# Patient Record
Sex: FEMALE | Race: WHITE
[De-identification: names, ages, dates, MRNs, and addresses within clinical notes are randomized per-mention and may not be internally consistent; named-entity substitution may affect disease eponyms.]

---

## 2020-03-24 ENCOUNTER — HOSPITAL ENCOUNTER (OUTPATIENT)
Age: 85
End: 2020-03-24
Payer: MEDICARE

## 2020-03-24 DIAGNOSIS — K86.89: Primary | ICD-10-CM

## 2020-03-24 PROCEDURE — 74183 MRI ABD W/O CNTR FLWD CNTR: CPT

## 2020-03-24 PROCEDURE — A9575 INJ GADOTERATE MEGLUMI 0.1ML: HCPCS

## 2023-05-02 LAB
ALANINE AMINOTRANSFERASE (SGPT) (U/L) IN SER/PLAS: 11 U/L (ref 7–45)
ALBUMIN (G/DL) IN SER/PLAS: 4.3 G/DL (ref 3.4–5)
ALKALINE PHOSPHATASE (U/L) IN SER/PLAS: 70 U/L (ref 33–136)
ANION GAP IN SER/PLAS: 12 MMOL/L (ref 10–20)
ASPARTATE AMINOTRANSFERASE (SGOT) (U/L) IN SER/PLAS: 19 U/L (ref 9–39)
BILIRUBIN TOTAL (MG/DL) IN SER/PLAS: 0.8 MG/DL (ref 0–1.2)
CALCIUM (MG/DL) IN SER/PLAS: 9.4 MG/DL (ref 8.6–10.3)
CARBON DIOXIDE, TOTAL (MMOL/L) IN SER/PLAS: 29 MMOL/L (ref 21–32)
CHLORIDE (MMOL/L) IN SER/PLAS: 103 MMOL/L (ref 98–107)
CHOLESTEROL (MG/DL) IN SER/PLAS: 223 MG/DL (ref 0–199)
CHOLESTEROL IN HDL (MG/DL) IN SER/PLAS: 81 MG/DL
CHOLESTEROL/HDL RATIO: 2.8
CREATININE (MG/DL) IN SER/PLAS: 1.04 MG/DL (ref 0.5–1.05)
GFR FEMALE: 52 ML/MIN/1.73M2
GLUCOSE (MG/DL) IN SER/PLAS: 85 MG/DL (ref 74–99)
LDL: 121 MG/DL (ref 0–99)
POTASSIUM (MMOL/L) IN SER/PLAS: 3.9 MMOL/L (ref 3.5–5.3)
PROTEIN TOTAL: 7.6 G/DL (ref 6.4–8.2)
SODIUM (MMOL/L) IN SER/PLAS: 140 MMOL/L (ref 136–145)
THYROTROPIN (MIU/L) IN SER/PLAS BY DETECTION LIMIT <= 0.05 MIU/L: 3.56 MIU/L (ref 0.44–3.98)
TRIGLYCERIDE (MG/DL) IN SER/PLAS: 103 MG/DL (ref 0–149)
UREA NITROGEN (MG/DL) IN SER/PLAS: 14 MG/DL (ref 6–23)
VLDL: 21 MG/DL (ref 0–40)

## 2023-05-16 ENCOUNTER — OFFICE VISIT (OUTPATIENT)
Dept: PRIMARY CARE | Facility: CLINIC | Age: 88
End: 2023-05-16
Payer: COMMERCIAL

## 2023-05-16 VITALS
BODY MASS INDEX: 27.79 KG/M2 | WEIGHT: 151 LBS | DIASTOLIC BLOOD PRESSURE: 74 MMHG | HEIGHT: 62 IN | OXYGEN SATURATION: 97 % | SYSTOLIC BLOOD PRESSURE: 110 MMHG | HEART RATE: 72 BPM

## 2023-05-16 DIAGNOSIS — M81.0 MENOPAUSAL OSTEOPOROSIS: ICD-10-CM

## 2023-05-16 DIAGNOSIS — K45.8 LUMBAR HERNIA: ICD-10-CM

## 2023-05-16 DIAGNOSIS — E53.8 VITAMIN B12 DEFICIENCY: ICD-10-CM

## 2023-05-16 DIAGNOSIS — E78.49 FAMILIAL HYPERLIPIDEMIA: ICD-10-CM

## 2023-05-16 DIAGNOSIS — R47.02: ICD-10-CM

## 2023-05-16 DIAGNOSIS — E03.9 ACQUIRED HYPOTHYROIDISM: Primary | ICD-10-CM

## 2023-05-16 DIAGNOSIS — I10 ESSENTIAL HYPERTENSION: ICD-10-CM

## 2023-05-16 DIAGNOSIS — N18.31 STAGE 3A CHRONIC KIDNEY DISEASE (MULTI): ICD-10-CM

## 2023-05-16 DIAGNOSIS — G40.909 SEIZURE DISORDER (MULTI): ICD-10-CM

## 2023-05-16 DIAGNOSIS — K59.04 CHRONIC IDIOPATHIC CONSTIPATION: ICD-10-CM

## 2023-05-16 DIAGNOSIS — R13.19 ESOPHAGEAL DYSPHAGIA: ICD-10-CM

## 2023-05-16 PROBLEM — S82.032A CLOSED DISPLACED TRANSVERSE FRACTURE OF LEFT PATELLA: Status: ACTIVE | Noted: 2018-03-08

## 2023-05-16 PROBLEM — S82.032A CLOSED DISPLACED TRANSVERSE FRACTURE OF LEFT PATELLA: Status: RESOLVED | Noted: 2018-03-08 | Resolved: 2023-05-16

## 2023-05-16 PROBLEM — E87.6 HYPOKALEMIA: Status: ACTIVE | Noted: 2023-05-16

## 2023-05-16 PROBLEM — K59.00 CONSTIPATION: Status: ACTIVE | Noted: 2023-05-16

## 2023-05-16 PROBLEM — E87.6 HYPOKALEMIA: Status: RESOLVED | Noted: 2023-05-16 | Resolved: 2023-05-16

## 2023-05-16 PROBLEM — L21.9 SEBORRHEIC DERMATITIS: Status: ACTIVE | Noted: 2023-05-16

## 2023-05-16 PROCEDURE — 1159F MED LIST DOCD IN RCRD: CPT | Performed by: FAMILY MEDICINE

## 2023-05-16 PROCEDURE — 3078F DIAST BP <80 MM HG: CPT | Performed by: FAMILY MEDICINE

## 2023-05-16 PROCEDURE — 3074F SYST BP LT 130 MM HG: CPT | Performed by: FAMILY MEDICINE

## 2023-05-16 PROCEDURE — 99214 OFFICE O/P EST MOD 30 MIN: CPT | Performed by: FAMILY MEDICINE

## 2023-05-16 PROCEDURE — 1160F RVW MEDS BY RX/DR IN RCRD: CPT | Performed by: FAMILY MEDICINE

## 2023-05-16 PROCEDURE — 1036F TOBACCO NON-USER: CPT | Performed by: FAMILY MEDICINE

## 2023-05-16 RX ORDER — LAMOTRIGINE 25 MG/1
3 TABLET ORAL 2 TIMES DAILY
COMMUNITY
End: 2024-01-25 | Stop reason: SDUPTHER

## 2023-05-16 RX ORDER — NAPROXEN SODIUM 220 MG/1
81 TABLET, FILM COATED ORAL DAILY
COMMUNITY
Start: 2018-01-28

## 2023-05-16 RX ORDER — LEVOTHYROXINE SODIUM 88 UG/1
88 TABLET ORAL DAILY
COMMUNITY
End: 2024-01-25 | Stop reason: SDUPTHER

## 2023-05-16 RX ORDER — GABAPENTIN 100 MG/1
100 CAPSULE ORAL DAILY
COMMUNITY
Start: 2023-02-06 | End: 2023-11-16 | Stop reason: ALTCHOICE

## 2023-05-16 RX ORDER — MULTIVITAMIN
1 TABLET ORAL DAILY
COMMUNITY
Start: 2020-01-13

## 2023-05-16 RX ORDER — ASPIRIN 325 MG
1 TABLET, DELAYED RELEASE (ENTERIC COATED) ORAL
COMMUNITY

## 2023-05-16 RX ORDER — LANOLIN ALCOHOL/MO/W.PET/CERES
1 CREAM (GRAM) TOPICAL DAILY
COMMUNITY
Start: 2021-07-13

## 2023-05-16 RX ORDER — TRIAMTERENE/HYDROCHLOROTHIAZID 37.5-25 MG
1 TABLET ORAL DAILY
COMMUNITY
End: 2024-05-02 | Stop reason: SDUPTHER

## 2023-05-16 ASSESSMENT — PATIENT HEALTH QUESTIONNAIRE - PHQ9
2. FEELING DOWN, DEPRESSED OR HOPELESS: NOT AT ALL
1. LITTLE INTEREST OR PLEASURE IN DOING THINGS: NOT AT ALL
SUM OF ALL RESPONSES TO PHQ9 QUESTIONS 1 AND 2: 0

## 2023-05-16 NOTE — PROGRESS NOTES
"Subjective   Patient ID: Nimisha Ingram is a 88 y.o. female who presents for Med Management.    HPI   Acquired hypothyroidism NOS - Med works well without side effects. Good in May 2023.  No temp intolerance.      Cervical pain (neck) - Comes and goes. Advil or Tylenol helps.      Familial hyperlipidemia  and HTN - no meds and great HDL in May 2023. No Chest pain, Dyspnea, palpitations, numbness, weakness, edema, claudications, or double vision/ loss of vision.  Some edema when she rides a lot.  The dizziness and pain in head is better not she was having is better with the gabapentin.   Can cancel neurology appointment.  Says in 140s at times.      Family history colon cancer - scope in 2016 . No blood      History of small bowel obstruction no blockage. No blood in the stool. No diarrhea or constipation or bloating now      Lumbar hernia treated with surgery and doing well but having some bulging now. Will call if worse and I can refer back to surgery. Cannot lift much. Pain at times if she lays on that side.  Seems better the last few weeks. Shoulder at times in traps. Cervical spondylosis.      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 She is wondering why she has that. Has been to Dr Daily.      Hearing loss - She wears hearing aids at times. Not very often as by herself.      Presbyesophagus - has some times when she has to drink water to get food down.      Seizure disorder - no recurrence for years      B12 deficiency - noted last time. She is on B12 and good last time       Stage 3A CKD - GFR stable at 52    Review of Systems    Objective   /74 (BP Location: Left arm, Patient Position: Sitting)   Pulse 72   Ht 1.575 m (5' 2\")   Wt 68.5 kg (151 lb)   SpO2 97%   BMI 27.62 kg/m²     Physical Exam  Vitals reviewed.   Constitutional:       General: " She is not in acute distress.     Appearance: Normal appearance.   HENT:      Head: Normocephalic.      Right Ear: Tympanic membrane normal.      Left Ear: Tympanic membrane normal.      Nose: Nose normal.      Mouth/Throat:      Pharynx: Oropharynx is clear.      Comments: Dentures   Eyes:      Extraocular Movements: Extraocular movements intact.      Conjunctiva/sclera: Conjunctivae normal.      Pupils: Pupils are equal, round, and reactive to light.   Neck:      Vascular: No carotid bruit.   Cardiovascular:      Rate and Rhythm: Normal rate and regular rhythm.      Pulses: Normal pulses.      Heart sounds: Normal heart sounds. No murmur heard.  Pulmonary:      Effort: Pulmonary effort is normal. No respiratory distress.      Breath sounds: Normal breath sounds.   Abdominal:      General: Abdomen is flat. Bowel sounds are normal. There is no distension.      Palpations: Abdomen is soft. There is no mass.      Tenderness: There is no abdominal tenderness.   Musculoskeletal:      Cervical back: Normal range of motion and neck supple. No tenderness.      Comments: Tight and tender in the lower paracervical muscles and traps    Lymphadenopathy:      Cervical: No cervical adenopathy.   Skin:     General: Skin is warm and dry.      Findings: No rash.   Neurological:      General: No focal deficit present.      Mental Status: She is alert and oriented to person, place, and time.   Psychiatric:         Mood and Affect: Mood normal.         Thought Content: Thought content normal.         Judgment: Judgment normal.         Assessment/Plan   Problem List Items Addressed This Visit       Acquired hypothyroidism - Primary    Constipation    Esophageal dysphagia    Essential hypertension    Familial hyperlipidemia    Lumbar hernia    Menopausal osteoporosis    Motor dysphasia    Seizure disorder (CMS/HCC)    Stage 3a chronic kidney disease    Relevant Orders    Comprehensive Metabolic Panel    CBC    Follow Up In Primary Care     Vitamin B12 deficiency    Relevant Orders    Vitamin B12

## 2023-11-07 ENCOUNTER — LAB (OUTPATIENT)
Dept: LAB | Facility: LAB | Age: 88
End: 2023-11-07
Payer: COMMERCIAL

## 2023-11-07 DIAGNOSIS — N18.31 STAGE 3A CHRONIC KIDNEY DISEASE (MULTI): ICD-10-CM

## 2023-11-07 DIAGNOSIS — E53.8 VITAMIN B12 DEFICIENCY: ICD-10-CM

## 2023-11-07 LAB
ALBUMIN SERPL BCP-MCNC: 4.6 G/DL (ref 3.4–5)
ALP SERPL-CCNC: 64 U/L (ref 33–136)
ALT SERPL W P-5'-P-CCNC: 12 U/L (ref 7–45)
ANION GAP SERPL CALC-SCNC: 12 MMOL/L (ref 10–20)
AST SERPL W P-5'-P-CCNC: 19 U/L (ref 9–39)
BILIRUB SERPL-MCNC: 0.8 MG/DL (ref 0–1.2)
BUN SERPL-MCNC: 16 MG/DL (ref 6–23)
CALCIUM SERPL-MCNC: 9.7 MG/DL (ref 8.6–10.3)
CHLORIDE SERPL-SCNC: 101 MMOL/L (ref 98–107)
CO2 SERPL-SCNC: 30 MMOL/L (ref 21–32)
CREAT SERPL-MCNC: 1.03 MG/DL (ref 0.5–1.05)
ERYTHROCYTE [DISTWIDTH] IN BLOOD BY AUTOMATED COUNT: 12.2 % (ref 11.5–14.5)
GFR SERPL CREATININE-BSD FRML MDRD: 53 ML/MIN/1.73M*2
GLUCOSE SERPL-MCNC: 97 MG/DL (ref 74–99)
HCT VFR BLD AUTO: 47.7 % (ref 36–46)
HGB BLD-MCNC: 15.1 G/DL (ref 12–16)
MCH RBC QN AUTO: 29.7 PG (ref 26–34)
MCHC RBC AUTO-ENTMCNC: 31.7 G/DL (ref 32–36)
MCV RBC AUTO: 94 FL (ref 80–100)
NRBC BLD-RTO: 0 /100 WBCS (ref 0–0)
PLATELET # BLD AUTO: 380 X10*3/UL (ref 150–450)
POTASSIUM SERPL-SCNC: 4.1 MMOL/L (ref 3.5–5.3)
PROT SERPL-MCNC: 7.2 G/DL (ref 6.4–8.2)
RBC # BLD AUTO: 5.09 X10*6/UL (ref 4–5.2)
SODIUM SERPL-SCNC: 139 MMOL/L (ref 136–145)
WBC # BLD AUTO: 5.3 X10*3/UL (ref 4.4–11.3)

## 2023-11-07 PROCEDURE — 80053 COMPREHEN METABOLIC PANEL: CPT

## 2023-11-07 PROCEDURE — 85027 COMPLETE CBC AUTOMATED: CPT

## 2023-11-07 PROCEDURE — 36415 COLL VENOUS BLD VENIPUNCTURE: CPT

## 2023-11-07 PROCEDURE — 82607 VITAMIN B-12: CPT

## 2023-11-08 LAB — VIT B12 SERPL-MCNC: 1312 PG/ML (ref 211–911)

## 2023-11-16 ENCOUNTER — OFFICE VISIT (OUTPATIENT)
Dept: PRIMARY CARE | Facility: CLINIC | Age: 88
End: 2023-11-16
Payer: COMMERCIAL

## 2023-11-16 VITALS
BODY MASS INDEX: 25.97 KG/M2 | HEART RATE: 78 BPM | OXYGEN SATURATION: 96 % | WEIGHT: 142 LBS | DIASTOLIC BLOOD PRESSURE: 80 MMHG | SYSTOLIC BLOOD PRESSURE: 138 MMHG

## 2023-11-16 DIAGNOSIS — E03.9 ACQUIRED HYPOTHYROIDISM: ICD-10-CM

## 2023-11-16 DIAGNOSIS — R53.1 SPELL OF GENERALIZED WEAKNESS: ICD-10-CM

## 2023-11-16 DIAGNOSIS — M81.0 MENOPAUSAL OSTEOPOROSIS: ICD-10-CM

## 2023-11-16 DIAGNOSIS — L21.9 SEBORRHEIC DERMATITIS: ICD-10-CM

## 2023-11-16 DIAGNOSIS — R00.2 HEART PALPITATIONS: ICD-10-CM

## 2023-11-16 DIAGNOSIS — E78.49 FAMILIAL HYPERLIPIDEMIA: ICD-10-CM

## 2023-11-16 DIAGNOSIS — K45.8 LUMBAR HERNIA: ICD-10-CM

## 2023-11-16 DIAGNOSIS — E53.8 VITAMIN B12 DEFICIENCY: ICD-10-CM

## 2023-11-16 DIAGNOSIS — G40.909 SEIZURE DISORDER (MULTI): ICD-10-CM

## 2023-11-16 DIAGNOSIS — R47.02: ICD-10-CM

## 2023-11-16 DIAGNOSIS — Z00.00 ROUTINE GENERAL MEDICAL EXAMINATION AT HEALTH CARE FACILITY: Primary | ICD-10-CM

## 2023-11-16 DIAGNOSIS — R13.19 ESOPHAGEAL DYSPHAGIA: ICD-10-CM

## 2023-11-16 DIAGNOSIS — K59.04 CHRONIC IDIOPATHIC CONSTIPATION: ICD-10-CM

## 2023-11-16 DIAGNOSIS — I10 ESSENTIAL HYPERTENSION: ICD-10-CM

## 2023-11-16 DIAGNOSIS — N18.31 STAGE 3A CHRONIC KIDNEY DISEASE (MULTI): ICD-10-CM

## 2023-11-16 PROCEDURE — 1160F RVW MEDS BY RX/DR IN RCRD: CPT | Performed by: FAMILY MEDICINE

## 2023-11-16 PROCEDURE — G0439 PPPS, SUBSEQ VISIT: HCPCS | Performed by: FAMILY MEDICINE

## 2023-11-16 PROCEDURE — 3075F SYST BP GE 130 - 139MM HG: CPT | Performed by: FAMILY MEDICINE

## 2023-11-16 PROCEDURE — 3079F DIAST BP 80-89 MM HG: CPT | Performed by: FAMILY MEDICINE

## 2023-11-16 PROCEDURE — 1170F FXNL STATUS ASSESSED: CPT | Performed by: FAMILY MEDICINE

## 2023-11-16 PROCEDURE — 99214 OFFICE O/P EST MOD 30 MIN: CPT | Performed by: FAMILY MEDICINE

## 2023-11-16 PROCEDURE — 1036F TOBACCO NON-USER: CPT | Performed by: FAMILY MEDICINE

## 2023-11-16 PROCEDURE — 1159F MED LIST DOCD IN RCRD: CPT | Performed by: FAMILY MEDICINE

## 2023-11-16 ASSESSMENT — ACTIVITIES OF DAILY LIVING (ADL)
DOING_HOUSEWORK: INDEPENDENT
MANAGING_FINANCES: INDEPENDENT
BATHING: INDEPENDENT
TAKING_MEDICATION: INDEPENDENT
GROCERY_SHOPPING: INDEPENDENT
DRESSING: INDEPENDENT

## 2023-11-16 ASSESSMENT — PATIENT HEALTH QUESTIONNAIRE - PHQ9
2. FEELING DOWN, DEPRESSED OR HOPELESS: NOT AT ALL
SUM OF ALL RESPONSES TO PHQ9 QUESTIONS 1 AND 2: 0
1. LITTLE INTEREST OR PLEASURE IN DOING THINGS: NOT AT ALL

## 2023-11-16 NOTE — PROGRESS NOTES
Subjective   Reason for Visit: Nimisha Ingram is an 88 y.o. female here for a Medicare Wellness visit.     Past Medical, Surgical, and Family History reviewed and updated in chart.    Reviewed all medications by prescribing practitioner or clinical pharmacist (such as prescriptions, OTCs, herbal therapies and supplements) and documented in the medical record.    HPI  Acquired hypothyroidism NOS - Med works well without side effects. Good in May 2023.  No temp intolerance.      Cervical pain (neck) - Comes and goes. Advil or Tylenol helps.      Familial hyperlipidemia  and HTN - no meds and great HDL in May 2023. No Chest pain, palpitations, numbness, weakness, edema, claudications, or double vision/ loss of vision.  Some edema when she rides a lot.  Short of breath if she does much. Does have weakn spells and lightheaded. This is different from the pain and dizzinesss she had in the past.  When she has it will get some heart racing.      Family history colon cancer - scope in 2016.  No blood      History of small bowel obstruction no blockage. No blood in the stool. No diarrhea or constipation or bloating now      Lumbar hernia treated with surgery and doing well but having some bulging now. Left flank.   Will call if worse and I can refer back to surgery. Cannot lift much. Pain at times if she lays on that side.   Shoulder at times in traps but has Cervical spondylosis.      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 She is wondering why she has that. Has been to Dr Daily.      Hearing loss - She wears hearing aids at times. Not very often as by herself.      Presbyesophagus - has some times when she has to drink water to get food down.      Seizure disorder - no recurrence for years      B12 deficiency - noted last time. She is on B12 and good last time        Stage 3A CKD - GFR stable at 53    LW and DPA no but will be sons and daughter  Pneumnovax UTD    Patient Care Team:  Evaristo Borja MD as PCP - General  Evaristo Borja MD as PCP - [facility] [employer] PCP     Review of Systems    Objective   Vitals:  /80 (BP Location: Right arm, Patient Position: Sitting)   Pulse 78   Wt 64.4 kg (142 lb)   SpO2 96%   BMI 25.97 kg/m²       Physical Exam  Vitals reviewed.   Constitutional:       General: She is not in acute distress.     Appearance: Normal appearance.   HENT:      Head: Normocephalic.      Right Ear: Tympanic membrane, ear canal and external ear normal.      Left Ear: Tympanic membrane, ear canal and external ear normal. Decreased hearing noted.      Nose: Nose normal.      Mouth/Throat:      Pharynx: Oropharynx is clear.      Comments: Dentures in place upper and lower   Eyes:      Extraocular Movements: Extraocular movements intact.      Conjunctiva/sclera: Conjunctivae normal.      Pupils: Pupils are equal, round, and reactive to light.   Neck:      Vascular: No carotid bruit.   Cardiovascular:      Rate and Rhythm: Normal rate and regular rhythm.      Pulses: Normal pulses.      Heart sounds: Normal heart sounds. No murmur heard.  Pulmonary:      Effort: Pulmonary effort is normal. No respiratory distress.      Breath sounds: Normal breath sounds.   Abdominal:      General: Abdomen is flat. Bowel sounds are normal. There is no distension.      Palpations: Abdomen is soft. There is no mass.      Tenderness: There is no abdominal tenderness.   Musculoskeletal:      Cervical back: Normal range of motion and neck supple. No tenderness.   Lymphadenopathy:      Cervical: No cervical adenopathy.   Skin:     General: Skin is warm and dry.      Findings: No rash.   Neurological:      General: No focal deficit present.      Mental Status: She is alert and oriented to person, place, and time.   Psychiatric:         Mood and Affect: Mood normal.          Thought Content: Thought content normal.         Judgment: Judgment normal.         Assessment/Plan   Problem List Items Addressed This Visit       Acquired hypothyroidism    Relevant Orders    TSH with reflex to Free T4 if abnormal    Constipation    Esophageal dysphagia    Essential hypertension    Familial hyperlipidemia    Relevant Orders    Lipid Panel    Lumbar hernia    Menopausal osteoporosis    Motor dysphasia    Seborrheic dermatitis    Seizure disorder (CMS/HCC)    Stage 3a chronic kidney disease (CMS/HCC)    Relevant Orders    Comprehensive Metabolic Panel    CBC    Follow Up In Primary Care - Established    Vitamin B12 deficiency     Other Visit Diagnoses       Routine general medical examination at health care facility    -  Primary    Spell of generalized weakness        Relevant Orders    Holter or Event Cardiac Monitor    Heart palpitations        Relevant Orders    Holter or Event Cardiac Monitor

## 2023-11-20 ENCOUNTER — HOSPITAL ENCOUNTER (OUTPATIENT)
Dept: CARDIOLOGY | Facility: HOSPITAL | Age: 88
Discharge: HOME | End: 2023-11-20
Payer: COMMERCIAL

## 2023-11-20 DIAGNOSIS — R00.2 HEART PALPITATIONS: ICD-10-CM

## 2023-11-20 DIAGNOSIS — R53.1 SPELL OF GENERALIZED WEAKNESS: ICD-10-CM

## 2023-11-20 PROCEDURE — 93244 EXT ECG>48HR<7D REV&INTERPJ: CPT | Performed by: STUDENT IN AN ORGANIZED HEALTH CARE EDUCATION/TRAINING PROGRAM

## 2023-11-20 PROCEDURE — 93242 EXT ECG>48HR<7D RECORDING: CPT

## 2023-11-20 PROCEDURE — 9420000001 HC RT PATIENT EDUCATION 5 MIN

## 2023-12-13 ENCOUNTER — TELEPHONE (OUTPATIENT)
Dept: PRIMARY CARE | Facility: CLINIC | Age: 88
End: 2023-12-13
Payer: COMMERCIAL

## 2023-12-13 DIAGNOSIS — I47.10 SVT (SUPRAVENTRICULAR TACHYCARDIA) (CMS-HCC): Primary | ICD-10-CM

## 2023-12-13 NOTE — TELEPHONE ENCOUNTER
Patients son was calling about her holter monitor results from November.  Stated patient hasn't heard anything about the results from it.    Please call him   Infliximab Counseling:  I discussed with the patient the risks of infliximab including but not limited to myelosuppression, immunosuppression, autoimmune hepatitis, demyelinating diseases, lymphoma, and serious infections.  The patient understands that monitoring is required including a PPD at baseline and must alert us or the primary physician if symptoms of infection or other concerning signs are noted.

## 2023-12-15 ENCOUNTER — APPOINTMENT (OUTPATIENT)
Dept: CARDIOLOGY | Facility: CLINIC | Age: 88
End: 2023-12-15
Payer: COMMERCIAL

## 2023-12-20 ENCOUNTER — OFFICE VISIT (OUTPATIENT)
Dept: URGENT CARE | Facility: CLINIC | Age: 88
End: 2023-12-20
Payer: COMMERCIAL

## 2023-12-20 VITALS
HEART RATE: 85 BPM | DIASTOLIC BLOOD PRESSURE: 74 MMHG | RESPIRATION RATE: 14 BRPM | TEMPERATURE: 97.8 F | BODY MASS INDEX: 26.13 KG/M2 | WEIGHT: 142 LBS | SYSTOLIC BLOOD PRESSURE: 176 MMHG | OXYGEN SATURATION: 96 % | HEIGHT: 62 IN

## 2023-12-20 DIAGNOSIS — J20.9 ACUTE BRONCHITIS, UNSPECIFIED ORGANISM: Primary | ICD-10-CM

## 2023-12-20 PROCEDURE — 99213 OFFICE O/P EST LOW 20 MIN: CPT | Mod: 25

## 2023-12-20 RX ORDER — DEXAMETHASONE 6 MG/1
6 TABLET ORAL DAILY
Qty: 5 TABLET | Refills: 0 | Status: SHIPPED | OUTPATIENT
Start: 2023-12-20 | End: 2023-12-25

## 2023-12-20 RX ORDER — DOXYCYCLINE 100 MG/1
100 TABLET ORAL 2 TIMES DAILY
Qty: 14 TABLET | Refills: 0 | Status: SHIPPED | OUTPATIENT
Start: 2023-12-20 | End: 2023-12-27

## 2023-12-20 RX ORDER — ALBUTEROL SULFATE 90 UG/1
2 AEROSOL, METERED RESPIRATORY (INHALATION) EVERY 6 HOURS PRN
Qty: 18 G | Refills: 0 | Status: SHIPPED | OUTPATIENT
Start: 2023-12-20 | End: 2024-04-18 | Stop reason: ALTCHOICE

## 2023-12-20 NOTE — PROGRESS NOTES
88 y.o. female presents for evaluation of URI.  Symptoms including cough, congestion, body aches, malaise, and headache have been present for 10 days and refractory to OTC meds.  No fever, chills, nausea, vomiting, abdominal pain, CP, or SOB.  No exacerbating factors. No known COVID 19/flu exposure.        Vitals:    12/20/23 1029   BP: 176/74   Pulse: 85   Resp: 14   Temp: 36.6 °C (97.8 °F)   SpO2: 96%       Allergies   Allergen Reactions    Cefazolin Unknown    Fish Containing Products Unknown    Iodinated Contrast Media Unknown       Medication Documentation Review Audit       Reviewed by Yohana Aguilar MA (Medical Assistant) on 12/20/23 at 1036      Medication Order Taking? Sig Documenting Provider Last Dose Status   aspirin 81 mg chewable tablet [id number] Yes Chew 1 tablet (81 mg) once daily. Historical Provider, MD Taking Active   cholecalciferol (Vitamin D-3) 1,250 mcg (50,000 unit) capsule [id number] Yes Take 1 capsule (50,000 Units) by mouth 1 (one) time per week. Historical MD Eric Taking Active   cyanocobalamin (Vitamin B-12) 1,000 mcg tablet [id number] Yes Take 1 tablet (1,000 mcg) by mouth once daily. Historical Provider, MD Taking Active   lamoTRIgine (LaMICtal) 25 mg tablet [id number] Yes Take 3 tablets (75 mg) by mouth 2 times a day. Historical Provider, MD Taking Active   levothyroxine (Synthroid, Levoxyl) 88 mcg tablet [id number] Yes Take 1 tablet (88 mcg) by mouth once daily. Historical Provider, MD Taking Active   multivitamin tablet [id number] Yes Take 1 tablet by mouth once daily. Historical Provider, MD Taking Active   triamterene-hydrochlorothiazid (Maxzide-25) 37.5-25 mg tablet [id number] Yes Take 1 tablet by mouth once daily. Take with food. Historical Provider, MD Taking Active                    Past Medical History:   Diagnosis Date    Acute gastritis without bleeding 07/14/2020    Acute gastritis without hemorrhage, unspecified gastritis type    Disorder of thyroid, unspecified 11/19/2019     Thyroid trouble    Encounter for general adult medical examination without abnormal findings 07/14/2020    Medicare annual wellness visit, initial    Personal history of other diseases of the digestive system 07/13/2021    History of constipation    Personal history of other diseases of the musculoskeletal system and connective tissue 11/19/2019    History of arthritis    Personal history of other diseases of the nervous system and sense organs 01/11/2022    History of mastoiditis    Personal history of other diseases of urinary system     History of kidney disease    Unspecified convulsions (CMS/HCC) 11/19/2019    Seizures       Past Surgical History:   Procedure Laterality Date    OTHER SURGICAL HISTORY  11/19/2019    Back surgery    OTHER SURGICAL HISTORY  11/19/2019    Ovarian surgery    OTHER SURGICAL HISTORY  11/19/2019    Knee surgery    OTHER SURGICAL HISTORY  01/13/2020    Small bowel resection    OTHER SURGICAL HISTORY  01/13/2020    Hysterectomy vaginal    OTHER SURGICAL HISTORY  01/13/2020    Cataract surgery    OTHER SURGICAL HISTORY  07/13/2020    Colonoscopy    OTHER SURGICAL HISTORY  07/14/2020    Hernia repair       ROS  See HPI    Physical Exam  Vitals and nursing note reviewed.   Constitutional:       Appearance: She is normal weight. She is ill-appearing (mildly).   HENT:      Head: Normocephalic and atraumatic.      Right Ear: Tympanic membrane and ear canal normal.      Left Ear: Tympanic membrane and ear canal normal.      Nose: Congestion present.      Mouth/Throat:      Mouth: Mucous membranes are moist.      Pharynx: Oropharynx is clear.   Eyes:      Extraocular Movements: Extraocular movements intact.      Conjunctiva/sclera: Conjunctivae normal.      Pupils: Pupils are equal, round, and reactive to light.   Cardiovascular:      Rate and Rhythm: Normal rate and regular rhythm.      Heart sounds: Normal heart sounds. No murmur heard.     No gallop.   Pulmonary:      Effort: Pulmonary effort  is normal. No respiratory distress.      Breath sounds: No stridor. Rales (faint to RLL) present. No wheezing or rhonchi.   Lymphadenopathy:      Cervical: No cervical adenopathy.   Skin:     General: Skin is warm and dry.      Capillary Refill: Capillary refill takes less than 2 seconds.   Neurological:      General: No focal deficit present.      Mental Status: She is alert and oriented to person, place, and time.   Psychiatric:         Mood and Affect: Mood normal.         Behavior: Behavior normal.         Assessment/Plan/MDM  Nimisha was seen today for uri.  Diagnoses and all orders for this visit:  Acute bronchitis, unspecified organism (Primary)  -     doxycycline (Adoxa) 100 mg tablet; Take 1 tablet (100 mg) by mouth 2 times a day for 7 days. Take with a full glass of water and do not lie down for at least 30 minutes after  -     dexAMETHasone (Decadron) 6 mg tablet; Take 1 tablet (6 mg) by mouth once daily for 5 days.  -     albuterol 90 mcg/actuation inhaler; Inhale 2 puffs every 6 hours if needed for wheezing.    Encouraged pt to continue otc cold remedies PRN, push by mouth fluids and rest. Patient's clinical presentation is otherwise unremarkable at this time. Patient is discharged with instructions to follow-up with primary care or seek emergency medical attention for worsening symptoms or any new concerns.    Sergey Spears CNP  [facility]  [phone]

## 2023-12-28 ENCOUNTER — OFFICE VISIT (OUTPATIENT)
Dept: PRIMARY CARE | Facility: CLINIC | Age: 88
End: 2023-12-28
Payer: COMMERCIAL

## 2023-12-28 VITALS
WEIGHT: 142 LBS | DIASTOLIC BLOOD PRESSURE: 82 MMHG | HEIGHT: 62 IN | OXYGEN SATURATION: 95 % | TEMPERATURE: 97.8 F | SYSTOLIC BLOOD PRESSURE: 148 MMHG | HEART RATE: 88 BPM | BODY MASS INDEX: 26.13 KG/M2

## 2023-12-28 DIAGNOSIS — J06.9 UPPER RESPIRATORY TRACT INFECTION, UNSPECIFIED TYPE: Primary | ICD-10-CM

## 2023-12-28 PROCEDURE — 3079F DIAST BP 80-89 MM HG: CPT

## 2023-12-28 PROCEDURE — 1036F TOBACCO NON-USER: CPT

## 2023-12-28 PROCEDURE — 99213 OFFICE O/P EST LOW 20 MIN: CPT

## 2023-12-28 PROCEDURE — 1160F RVW MEDS BY RX/DR IN RCRD: CPT

## 2023-12-28 PROCEDURE — 1159F MED LIST DOCD IN RCRD: CPT

## 2023-12-28 PROCEDURE — 3077F SYST BP >= 140 MM HG: CPT

## 2023-12-28 RX ORDER — METHYLPREDNISOLONE 4 MG/1
TABLET ORAL
Qty: 21 TABLET | Refills: 0 | Status: SHIPPED | OUTPATIENT
Start: 2023-12-28 | End: 2024-01-04

## 2023-12-28 RX ORDER — AMOXICILLIN AND CLAVULANATE POTASSIUM 875; 125 MG/1; MG/1
875 TABLET, FILM COATED ORAL 2 TIMES DAILY
Qty: 20 TABLET | Refills: 0 | Status: SHIPPED | OUTPATIENT
Start: 2023-12-28 | End: 2024-01-07

## 2023-12-28 ASSESSMENT — ENCOUNTER SYMPTOMS
CONSTITUTIONAL NEGATIVE: 1
GASTROINTESTINAL NEGATIVE: 1
CARDIOVASCULAR NEGATIVE: 1
COUGH: 1

## 2023-12-28 NOTE — PROGRESS NOTES
"Subjective   Patient ID: Nimisha Ingram is a 88 y.o. female who presents for pt c/o cough, runny nose x 3 weeks .    HPI   [facility] for doxy and dexamethasone, finished these this week and still with significant symptoms. Symptoms sinus congestion/pressure, eye pressure, chest congestion, cough mildly productive. No fever.    No home COVID test  Pneumonia vaccinated    Review of Systems   Constitutional: Negative.    HENT:  Positive for congestion.    Respiratory:  Positive for cough.    Cardiovascular: Negative.    Gastrointestinal: Negative.        Objective   /82   Pulse 88   Temp 36.6 °C (97.8 °F)   Ht 1.575 m (5' 2\")   Wt 64.4 kg (142 lb)   SpO2 95%   BMI 25.97 kg/m²     Physical Exam  Constitutional:       General: She is not in acute distress.     Appearance: Normal appearance. She is not ill-appearing.   HENT:      Head: Normocephalic and atraumatic.   Eyes:      Extraocular Movements: Extraocular movements intact.      Conjunctiva/sclera: Conjunctivae normal.   Cardiovascular:      Rate and Rhythm: Normal rate.   Pulmonary:      Effort: Pulmonary effort is normal.      Breath sounds: Normal breath sounds. No wheezing, rhonchi or rales.   Abdominal:      General: There is no distension.   Musculoskeletal:         General: Normal range of motion.      Cervical back: Normal range of motion.   Skin:     General: Skin is warm and dry.   Neurological:      General: No focal deficit present.      Mental Status: She is alert and oriented to person, place, and time.   Psychiatric:         Mood and Affect: Mood normal.         Behavior: Behavior normal.         Thought Content: Thought content normal.         Judgment: Judgment normal.         Assessment/Plan        Rx Medrol dose pack/Augmentin BID x10 days.  Advised otc symptomatic control.  Advised call me in about 1 week if symptoms not beginning to resolve.  Follow up prn.  "

## 2024-01-25 ENCOUNTER — TELEPHONE (OUTPATIENT)
Dept: PRIMARY CARE | Facility: CLINIC | Age: 89
End: 2024-01-25
Payer: COMMERCIAL

## 2024-01-25 DIAGNOSIS — G40.909 SEIZURE DISORDER (MULTI): ICD-10-CM

## 2024-01-25 DIAGNOSIS — E03.9 ACQUIRED HYPOTHYROIDISM: ICD-10-CM

## 2024-01-25 RX ORDER — LEVOTHYROXINE SODIUM 88 UG/1
88 TABLET ORAL DAILY
Qty: 90 TABLET | Refills: 1 | Status: SHIPPED | OUTPATIENT
Start: 2024-01-25

## 2024-01-25 RX ORDER — LAMOTRIGINE 25 MG/1
75 TABLET ORAL 2 TIMES DAILY
Qty: 540 TABLET | Refills: 1 | Status: SHIPPED | OUTPATIENT
Start: 2024-01-25 | End: 2024-07-23

## 2024-01-25 NOTE — TELEPHONE ENCOUNTER
Lmvm for patients son that I'm unable to reach patient and need to know what pharmacy she wants her Rx's sent to

## 2024-04-18 ENCOUNTER — OFFICE VISIT (OUTPATIENT)
Dept: PRIMARY CARE | Facility: CLINIC | Age: 89
End: 2024-04-18
Payer: COMMERCIAL

## 2024-04-18 VITALS
OXYGEN SATURATION: 93 % | WEIGHT: 146 LBS | HEART RATE: 83 BPM | DIASTOLIC BLOOD PRESSURE: 80 MMHG | BODY MASS INDEX: 26.7 KG/M2 | SYSTOLIC BLOOD PRESSURE: 138 MMHG

## 2024-04-18 DIAGNOSIS — S46.011A STRAIN OF RIGHT ROTATOR CUFF CAPSULE, INITIAL ENCOUNTER: Primary | ICD-10-CM

## 2024-04-18 PROCEDURE — 1159F MED LIST DOCD IN RCRD: CPT | Performed by: FAMILY MEDICINE

## 2024-04-18 PROCEDURE — 3079F DIAST BP 80-89 MM HG: CPT | Performed by: FAMILY MEDICINE

## 2024-04-18 PROCEDURE — 1036F TOBACCO NON-USER: CPT | Performed by: FAMILY MEDICINE

## 2024-04-18 PROCEDURE — 99213 OFFICE O/P EST LOW 20 MIN: CPT | Performed by: FAMILY MEDICINE

## 2024-04-18 PROCEDURE — 3075F SYST BP GE 130 - 139MM HG: CPT | Performed by: FAMILY MEDICINE
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
## 2024-04-19 ENCOUNTER — HOSPITAL ENCOUNTER (OUTPATIENT)
Dept: RADIOLOGY | Facility: CLINIC | Age: 89
Discharge: HOME | End: 2024-04-19
Payer: COMMERCIAL

## 2024-04-19 DIAGNOSIS — S46.011A STRAIN OF RIGHT ROTATOR CUFF CAPSULE, INITIAL ENCOUNTER: ICD-10-CM

## 2024-04-19 PROCEDURE — 73030 X-RAY EXAM OF SHOULDER: CPT | Mod: RIGHT SIDE | Performed by: RADIOLOGY

## 2024-04-19 PROCEDURE — 73030 X-RAY EXAM OF SHOULDER: CPT | Mod: RT
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
## 2024-04-29 ENCOUNTER — TELEPHONE (OUTPATIENT)
Dept: PRIMARY CARE | Facility: CLINIC | Age: 89
End: 2024-04-29
Payer: COMMERCIAL

## 2024-04-29 DIAGNOSIS — S46.011A STRAIN OF RIGHT ROTATOR CUFF CAPSULE, INITIAL ENCOUNTER: Primary | ICD-10-CM

## 2024-04-29 NOTE — TELEPHONE ENCOUNTER
Lilia stating patient isn't any better. Asking for an order for a CT. Asking for a call back at [phone]

## 2024-04-29 NOTE — TELEPHONE ENCOUNTER
Pc to Lilia and let her know CT has been ordered.  We will check with insurance for precert, so she needs to wait to hear from our office to schedule it.

## 2024-04-30 ENCOUNTER — TELEPHONE (OUTPATIENT)
Dept: PRIMARY CARE | Facility: CLINIC | Age: 89
End: 2024-04-30
Payer: COMMERCIAL

## 2024-04-30 DIAGNOSIS — I10 ESSENTIAL (PRIMARY) HYPERTENSION: ICD-10-CM

## 2024-04-30 RX ORDER — TRIAMTERENE/HYDROCHLOROTHIAZID 37.5-25 MG
1 TABLET ORAL DAILY
Qty: 90 TABLET | Refills: 3 | OUTPATIENT
Start: 2024-04-30

## 2024-05-01 ENCOUNTER — HOSPITAL ENCOUNTER (OUTPATIENT)
Dept: RADIOLOGY | Facility: HOSPITAL | Age: 89
Discharge: HOME | End: 2024-05-01
Payer: COMMERCIAL

## 2024-05-01 DIAGNOSIS — S46.011A STRAIN OF RIGHT ROTATOR CUFF CAPSULE, INITIAL ENCOUNTER: ICD-10-CM

## 2024-05-01 PROCEDURE — 73200 CT UPPER EXTREMITY W/O DYE: CPT | Mod: RT

## 2024-05-02 DIAGNOSIS — S46.011A STRAIN OF RIGHT ROTATOR CUFF CAPSULE, INITIAL ENCOUNTER: ICD-10-CM

## 2024-05-02 DIAGNOSIS — M75.110 NONTRAUMATIC INCOMPLETE TEAR OF ROTATOR CUFF, UNSPECIFIED LATERALITY: Primary | ICD-10-CM

## 2024-05-02 RX ORDER — TRIAMTERENE/HYDROCHLOROTHIAZID 37.5-25 MG
1 TABLET ORAL DAILY
Qty: 90 TABLET | Refills: 3 | Status: SHIPPED | OUTPATIENT
Start: 2024-05-02

## 2024-05-02 NOTE — TELEPHONE ENCOUNTER
Patient son called in and stated she is out of her blood pressure medication. [facility] [place]    Thank you

## 2024-05-02 NOTE — RESULT ENCOUNTER NOTE
No fracture but has a lot of arthritis and fluid in the joint that suggests a torn rotator cuff. She needs to see orthopedics. See if she has a preference

## 2024-05-08 ENCOUNTER — LAB (OUTPATIENT)
Dept: LAB | Facility: LAB | Age: 89
End: 2024-05-08
Payer: COMMERCIAL

## 2024-05-08 DIAGNOSIS — N18.31 STAGE 3A CHRONIC KIDNEY DISEASE (MULTI): ICD-10-CM

## 2024-05-08 DIAGNOSIS — E78.49 FAMILIAL HYPERLIPIDEMIA: ICD-10-CM

## 2024-05-08 DIAGNOSIS — E03.9 ACQUIRED HYPOTHYROIDISM: ICD-10-CM

## 2024-05-08 LAB
ALBUMIN SERPL BCP-MCNC: 4.3 G/DL (ref 3.4–5)
ALP SERPL-CCNC: 67 U/L (ref 33–136)
ALT SERPL W P-5'-P-CCNC: 9 U/L (ref 7–45)
ANION GAP SERPL CALC-SCNC: 12 MMOL/L (ref 10–20)
AST SERPL W P-5'-P-CCNC: 16 U/L (ref 9–39)
BILIRUB SERPL-MCNC: 0.8 MG/DL (ref 0–1.2)
BUN SERPL-MCNC: 16 MG/DL (ref 6–23)
CALCIUM SERPL-MCNC: 9.4 MG/DL (ref 8.6–10.3)
CHLORIDE SERPL-SCNC: 103 MMOL/L (ref 98–107)
CHOLEST SERPL-MCNC: 216 MG/DL (ref 0–199)
CHOLESTEROL/HDL RATIO: 2.8
CO2 SERPL-SCNC: 28 MMOL/L (ref 21–32)
CREAT SERPL-MCNC: 0.98 MG/DL (ref 0.5–1.05)
EGFRCR SERPLBLD CKD-EPI 2021: 56 ML/MIN/1.73M*2
ERYTHROCYTE [DISTWIDTH] IN BLOOD BY AUTOMATED COUNT: 12.4 % (ref 11.5–14.5)
GLUCOSE SERPL-MCNC: 110 MG/DL (ref 74–99)
HCT VFR BLD AUTO: 46.1 % (ref 36–46)
HDLC SERPL-MCNC: 76 MG/DL
HGB BLD-MCNC: 14.5 G/DL (ref 12–16)
LDLC SERPL CALC-MCNC: 116 MG/DL
MCH RBC QN AUTO: 29.6 PG (ref 26–34)
MCHC RBC AUTO-ENTMCNC: 31.5 G/DL (ref 32–36)
MCV RBC AUTO: 94 FL (ref 80–100)
NON HDL CHOLESTEROL: 140 MG/DL (ref 0–149)
NRBC BLD-RTO: 0 /100 WBCS (ref 0–0)
PLATELET # BLD AUTO: 367 X10*3/UL (ref 150–450)
POTASSIUM SERPL-SCNC: 4.3 MMOL/L (ref 3.5–5.3)
PROT SERPL-MCNC: 7.3 G/DL (ref 6.4–8.2)
RBC # BLD AUTO: 4.9 X10*6/UL (ref 4–5.2)
SODIUM SERPL-SCNC: 139 MMOL/L (ref 136–145)
T4 FREE SERPL-MCNC: 0.87 NG/DL (ref 0.61–1.12)
TRIGL SERPL-MCNC: 120 MG/DL (ref 0–149)
TSH SERPL-ACNC: 11.19 MIU/L (ref 0.44–3.98)
VLDL: 24 MG/DL (ref 0–40)
WBC # BLD AUTO: 6.6 X10*3/UL (ref 4.4–11.3)

## 2024-05-08 PROCEDURE — 80053 COMPREHEN METABOLIC PANEL: CPT

## 2024-05-08 PROCEDURE — 80061 LIPID PANEL: CPT

## 2024-05-08 PROCEDURE — 84439 ASSAY OF FREE THYROXINE: CPT

## 2024-05-08 PROCEDURE — 36415 COLL VENOUS BLD VENIPUNCTURE: CPT

## 2024-05-08 PROCEDURE — 85027 COMPLETE CBC AUTOMATED: CPT

## 2024-05-08 PROCEDURE — 84443 ASSAY THYROID STIM HORMONE: CPT

## 2024-05-16 ENCOUNTER — OFFICE VISIT (OUTPATIENT)
Dept: PRIMARY CARE | Facility: CLINIC | Age: 89
End: 2024-05-16
Payer: COMMERCIAL

## 2024-05-16 VITALS
WEIGHT: 147 LBS | BODY MASS INDEX: 27.05 KG/M2 | HEIGHT: 62 IN | OXYGEN SATURATION: 95 % | DIASTOLIC BLOOD PRESSURE: 72 MMHG | SYSTOLIC BLOOD PRESSURE: 126 MMHG | HEART RATE: 82 BPM

## 2024-05-16 DIAGNOSIS — K45.8 LUMBAR HERNIA: ICD-10-CM

## 2024-05-16 DIAGNOSIS — N18.31 STAGE 3A CHRONIC KIDNEY DISEASE (MULTI): ICD-10-CM

## 2024-05-16 DIAGNOSIS — M81.0 MENOPAUSAL OSTEOPOROSIS: ICD-10-CM

## 2024-05-16 DIAGNOSIS — G40.909 SEIZURE DISORDER (MULTI): ICD-10-CM

## 2024-05-16 DIAGNOSIS — R47.02: ICD-10-CM

## 2024-05-16 DIAGNOSIS — E03.9 ACQUIRED HYPOTHYROIDISM: ICD-10-CM

## 2024-05-16 DIAGNOSIS — E53.8 VITAMIN B12 DEFICIENCY: ICD-10-CM

## 2024-05-16 DIAGNOSIS — I10 ESSENTIAL HYPERTENSION: ICD-10-CM

## 2024-05-16 DIAGNOSIS — M19.011 PRIMARY OSTEOARTHRITIS OF RIGHT SHOULDER: Primary | ICD-10-CM

## 2024-05-16 DIAGNOSIS — K59.04 CHRONIC IDIOPATHIC CONSTIPATION: ICD-10-CM

## 2024-05-16 DIAGNOSIS — E78.49 FAMILIAL HYPERLIPIDEMIA: ICD-10-CM

## 2024-05-16 DIAGNOSIS — R13.19 ESOPHAGEAL DYSPHAGIA: ICD-10-CM

## 2024-05-16 PROCEDURE — 1123F ACP DISCUSS/DSCN MKR DOCD: CPT | Performed by: FAMILY MEDICINE

## 2024-05-16 PROCEDURE — 1036F TOBACCO NON-USER: CPT | Performed by: FAMILY MEDICINE

## 2024-05-16 PROCEDURE — 1159F MED LIST DOCD IN RCRD: CPT | Performed by: FAMILY MEDICINE

## 2024-05-16 PROCEDURE — 1158F ADVNC CARE PLAN TLK DOCD: CPT | Performed by: FAMILY MEDICINE

## 2024-05-16 PROCEDURE — 3074F SYST BP LT 130 MM HG: CPT | Performed by: FAMILY MEDICINE

## 2024-05-16 PROCEDURE — 1160F RVW MEDS BY RX/DR IN RCRD: CPT | Performed by: FAMILY MEDICINE

## 2024-05-16 PROCEDURE — 3078F DIAST BP <80 MM HG: CPT | Performed by: FAMILY MEDICINE

## 2024-05-16 PROCEDURE — 99214 OFFICE O/P EST MOD 30 MIN: CPT | Performed by: FAMILY MEDICINE

## 2024-05-16 ASSESSMENT — PATIENT HEALTH QUESTIONNAIRE - PHQ9
1. LITTLE INTEREST OR PLEASURE IN DOING THINGS: NOT AT ALL
2. FEELING DOWN, DEPRESSED OR HOPELESS: NOT AT ALL
SUM OF ALL RESPONSES TO PHQ9 QUESTIONS 1 AND 2: 0

## 2024-05-16 NOTE — PROGRESS NOTES
"Subjective   Patient ID: Nimisha Ingram is a 89 y.o. female who presents for Med Management.    HPI   Acquired hypothyroidism NOS - Med works well without side effects. High TSH but normal FT4 on 88 mcg.  Will keep on same dose      Cervical pain (neck) - Comes and goes. Advil or Tylenol helps. Worse with the shoulder pain      Familial hyperlipidemia  and HTN - no meds and great HDL in May 2024. No Chest pain, palpitations, numbness, weakness, edema, claudications, or double vision/ loss of vision.  Some edema when she rides a lot.  Short of breath if she does too much. Does have weakness spells and lightheaded when she first gets up.      Family history colon cancer - scope in 2016.  No blood      History of small bowel obstruction no blockage. No blood in the stool. No diarrhea or constipation or bloating now      Lumbar hernia treated with surgery and doing well but having some bulging now. Left flank.   Will call if worse and I can refer back to surgery. Cannot lift much. Pain at times if she lays on that side.        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 She is wondering why she has that. Has been to Dr Daily.      Hearing loss - She wears hearing aids at times. Not very often as by herself.     Osteoarthritis of shoulder - still has some pain but better with the injection. Follow up with Trice in Dr Christiansen's office      Presbyesophagus - has some times when she has to drink water to get food down.      Seizure disorder - no recurrence for years      B12 deficiency - noted last time. She is on B12 and good last time       Stage 3A CKD - GFR stable at 56     LW and DPA no but will be sons and daughter  Pneumovax UTD    Review of Systems    Objective   /72 (BP Location: Left arm, Patient Position: Sitting)   Pulse 82   Ht 1.575 m (5' 2\")   Wt " 66.7 kg (147 lb)   SpO2 95%   BMI 26.89 kg/m²     Physical Exam  Vitals reviewed.   Constitutional:       General: She is not in acute distress.     Appearance: Normal appearance.   HENT:      Head: Normocephalic.      Right Ear: Tympanic membrane, ear canal and external ear normal.      Left Ear: Tympanic membrane, ear canal and external ear normal.      Nose: Nose normal.      Mouth/Throat:      Mouth: Mucous membranes are moist.      Pharynx: Oropharynx is clear.      Comments: Tremulous voice   Eyes:      Extraocular Movements: Extraocular movements intact.      Conjunctiva/sclera: Conjunctivae normal.      Pupils: Pupils are equal, round, and reactive to light.   Neck:      Vascular: No carotid bruit.   Cardiovascular:      Rate and Rhythm: Normal rate and regular rhythm.      Pulses: Normal pulses.      Heart sounds: Normal heart sounds. No murmur heard.  Pulmonary:      Effort: Pulmonary effort is normal. No respiratory distress.      Breath sounds: Normal breath sounds.   Abdominal:      General: Abdomen is flat. Bowel sounds are normal. There is no distension.      Palpations: Abdomen is soft. There is no mass.      Tenderness: There is no abdominal tenderness.   Musculoskeletal:      Cervical back: Normal range of motion and neck supple. No tenderness.      Comments: Limited abduction right shoulder at 40 degrees.    Lymphadenopathy:      Cervical: No cervical adenopathy.   Skin:     General: Skin is warm and dry.      Findings: No rash.   Neurological:      General: No focal deficit present.      Mental Status: She is alert and oriented to person, place, and time.   Psychiatric:         Mood and Affect: Mood normal.         Thought Content: Thought content normal.         Judgment: Judgment normal.         Assessment/Plan   Diagnoses and all orders for this visit:  Primary osteoarthritis of right shoulder  Stage 3a chronic kidney disease (Multi)  -     Follow Up In Primary Care - Established  -     CBC;  Future  -     Comprehensive Metabolic Panel; Future  Acquired hypothyroidism  -     TSH with reflex to Free T4 if abnormal; Future  Familial hyperlipidemia  Essential hypertension  Esophageal dysphagia  Motor dysphasia  Chronic idiopathic constipation  Lumbar hernia  Menopausal osteoporosis  Seizure disorder (Multi)  Vitamin B12 deficiency  -     Vitamin B12; Future  Other orders  -     Follow Up In Primary Care - Established; Future

## 2024-07-22 ENCOUNTER — TELEPHONE (OUTPATIENT)
Dept: PRIMARY CARE | Facility: CLINIC | Age: 89
End: 2024-07-22
Payer: COMMERCIAL

## 2024-07-22 DIAGNOSIS — G40.909 SEIZURE DISORDER (MULTI): ICD-10-CM

## 2024-07-22 DIAGNOSIS — E03.9 ACQUIRED HYPOTHYROIDISM: ICD-10-CM

## 2024-07-22 RX ORDER — LEVOTHYROXINE SODIUM 88 UG/1
88 TABLET ORAL DAILY
Qty: 90 TABLET | Refills: 1 | Status: SHIPPED | OUTPATIENT
Start: 2024-07-22

## 2024-07-22 RX ORDER — LAMOTRIGINE 25 MG/1
75 TABLET ORAL 2 TIMES DAILY
Qty: 540 TABLET | Refills: 1 | Status: SHIPPED | OUTPATIENT
Start: 2024-07-22 | End: 2025-01-18

## 2024-10-30 ENCOUNTER — APPOINTMENT (OUTPATIENT)
Dept: RADIOLOGY | Facility: HOSPITAL | Age: 89
End: 2024-10-30
Payer: COMMERCIAL

## 2024-10-30 ENCOUNTER — APPOINTMENT (OUTPATIENT)
Dept: CARDIOLOGY | Facility: HOSPITAL | Age: 89
End: 2024-10-30
Payer: COMMERCIAL

## 2024-10-30 ENCOUNTER — HOSPITAL ENCOUNTER (EMERGENCY)
Facility: HOSPITAL | Age: 89
Discharge: HOME | End: 2024-10-30
Attending: EMERGENCY MEDICINE
Payer: COMMERCIAL

## 2024-10-30 VITALS
SYSTOLIC BLOOD PRESSURE: 135 MMHG | TEMPERATURE: 97.8 F | HEIGHT: 61 IN | WEIGHT: 144 LBS | DIASTOLIC BLOOD PRESSURE: 65 MMHG | OXYGEN SATURATION: 93 % | BODY MASS INDEX: 27.19 KG/M2 | RESPIRATION RATE: 7 BRPM | HEART RATE: 98 BPM

## 2024-10-30 DIAGNOSIS — R07.9 CHEST PAIN, UNSPECIFIED TYPE: ICD-10-CM

## 2024-10-30 DIAGNOSIS — K56.609 SMALL BOWEL OBSTRUCTION (MULTI): Primary | ICD-10-CM

## 2024-10-30 LAB
ALBUMIN SERPL BCP-MCNC: 3.8 G/DL (ref 3.4–5)
ALP SERPL-CCNC: 65 U/L (ref 33–136)
ALT SERPL W P-5'-P-CCNC: 6 U/L (ref 7–45)
ANION GAP SERPL CALC-SCNC: 15 MMOL/L (ref 10–20)
AST SERPL W P-5'-P-CCNC: 12 U/L (ref 9–39)
BASOPHILS # BLD AUTO: 0.02 X10*3/UL (ref 0–0.1)
BASOPHILS NFR BLD AUTO: 0.2 %
BILIRUB SERPL-MCNC: 0.7 MG/DL (ref 0–1.2)
BUN SERPL-MCNC: 11 MG/DL (ref 6–23)
CALCIUM SERPL-MCNC: 9.5 MG/DL (ref 8.6–10.3)
CARDIAC TROPONIN I PNL SERPL HS: 4 NG/L (ref 0–13)
CARDIAC TROPONIN I PNL SERPL HS: 5 NG/L (ref 0–13)
CHLORIDE SERPL-SCNC: 95 MMOL/L (ref 98–107)
CO2 SERPL-SCNC: 29 MMOL/L (ref 21–32)
CREAT SERPL-MCNC: 0.97 MG/DL (ref 0.5–1.05)
EGFRCR SERPLBLD CKD-EPI 2021: 56 ML/MIN/1.73M*2
EOSINOPHIL # BLD AUTO: 0.32 X10*3/UL (ref 0–0.4)
EOSINOPHIL NFR BLD AUTO: 3.3 %
ERYTHROCYTE [DISTWIDTH] IN BLOOD BY AUTOMATED COUNT: 11.7 % (ref 11.5–14.5)
GLUCOSE SERPL-MCNC: 152 MG/DL (ref 74–99)
HCT VFR BLD AUTO: 42.4 % (ref 36–46)
HGB BLD-MCNC: 14 G/DL (ref 12–16)
IMM GRANULOCYTES # BLD AUTO: 0.05 X10*3/UL (ref 0–0.5)
IMM GRANULOCYTES NFR BLD AUTO: 0.5 % (ref 0–0.9)
INR PPP: 1.1 (ref 0.9–1.1)
LIPASE SERPL-CCNC: 24 U/L (ref 9–82)
LYMPHOCYTES # BLD AUTO: 1.8 X10*3/UL (ref 0.8–3)
LYMPHOCYTES NFR BLD AUTO: 18.5 %
MCH RBC QN AUTO: 29.4 PG (ref 26–34)
MCHC RBC AUTO-ENTMCNC: 33 G/DL (ref 32–36)
MCV RBC AUTO: 89 FL (ref 80–100)
MONOCYTES # BLD AUTO: 0.95 X10*3/UL (ref 0.05–0.8)
MONOCYTES NFR BLD AUTO: 9.8 %
NEUTROPHILS # BLD AUTO: 6.58 X10*3/UL (ref 1.6–5.5)
NEUTROPHILS NFR BLD AUTO: 67.7 %
NRBC BLD-RTO: 0 /100 WBCS (ref 0–0)
PLATELET # BLD AUTO: 567 X10*3/UL (ref 150–450)
POTASSIUM SERPL-SCNC: 3.6 MMOL/L (ref 3.5–5.3)
PROT SERPL-MCNC: 6.8 G/DL (ref 6.4–8.2)
PROTHROMBIN TIME: 12.5 SECONDS (ref 9.8–12.8)
RBC # BLD AUTO: 4.76 X10*6/UL (ref 4–5.2)
SODIUM SERPL-SCNC: 135 MMOL/L (ref 136–145)
WBC # BLD AUTO: 9.7 X10*3/UL (ref 4.4–11.3)

## 2024-10-30 PROCEDURE — 96372 THER/PROPH/DIAG INJ SC/IM: CPT | Performed by: HOSPITALIST

## 2024-10-30 PROCEDURE — 2500000004 HC RX 250 GENERAL PHARMACY W/ HCPCS (ALT 636 FOR OP/ED): Performed by: HOSPITALIST

## 2024-10-30 PROCEDURE — 9420000001 HC RT PATIENT EDUCATION 5 MIN

## 2024-10-30 PROCEDURE — 96361 HYDRATE IV INFUSION ADD-ON: CPT

## 2024-10-30 PROCEDURE — 85025 COMPLETE CBC W/AUTO DIFF WBC: CPT | Performed by: EMERGENCY MEDICINE

## 2024-10-30 PROCEDURE — 84484 ASSAY OF TROPONIN QUANT: CPT | Performed by: EMERGENCY MEDICINE

## 2024-10-30 PROCEDURE — 74176 CT ABD & PELVIS W/O CONTRAST: CPT

## 2024-10-30 PROCEDURE — 80053 COMPREHEN METABOLIC PANEL: CPT | Performed by: EMERGENCY MEDICINE

## 2024-10-30 PROCEDURE — 2500000004 HC RX 250 GENERAL PHARMACY W/ HCPCS (ALT 636 FOR OP/ED): Performed by: EMERGENCY MEDICINE

## 2024-10-30 PROCEDURE — 85610 PROTHROMBIN TIME: CPT | Performed by: EMERGENCY MEDICINE

## 2024-10-30 PROCEDURE — 71045 X-RAY EXAM CHEST 1 VIEW: CPT | Performed by: RADIOLOGY

## 2024-10-30 PROCEDURE — 96376 TX/PRO/DX INJ SAME DRUG ADON: CPT

## 2024-10-30 PROCEDURE — 93005 ELECTROCARDIOGRAM TRACING: CPT

## 2024-10-30 PROCEDURE — 2550000001 HC RX 255 CONTRASTS: Performed by: EMERGENCY MEDICINE

## 2024-10-30 PROCEDURE — 74250 X-RAY XM SM INT 1CNTRST STD: CPT

## 2024-10-30 PROCEDURE — 96375 TX/PRO/DX INJ NEW DRUG ADDON: CPT

## 2024-10-30 PROCEDURE — 99222 1ST HOSP IP/OBS MODERATE 55: CPT | Performed by: SURGERY

## 2024-10-30 PROCEDURE — 99285 EMERGENCY DEPT VISIT HI MDM: CPT | Mod: 25

## 2024-10-30 PROCEDURE — 74176 CT ABD & PELVIS W/O CONTRAST: CPT | Performed by: RADIOLOGY

## 2024-10-30 PROCEDURE — 94664 DEMO&/EVAL PT USE INHALER: CPT

## 2024-10-30 PROCEDURE — 2500000005 HC RX 250 GENERAL PHARMACY W/O HCPCS: Performed by: EMERGENCY MEDICINE

## 2024-10-30 PROCEDURE — 36415 COLL VENOUS BLD VENIPUNCTURE: CPT | Performed by: EMERGENCY MEDICINE

## 2024-10-30 PROCEDURE — 96374 THER/PROPH/DIAG INJ IV PUSH: CPT

## 2024-10-30 PROCEDURE — 74250 X-RAY XM SM INT 1CNTRST STD: CPT | Performed by: RADIOLOGY

## 2024-10-30 PROCEDURE — 2500000004 HC RX 250 GENERAL PHARMACY W/ HCPCS (ALT 636 FOR OP/ED): Performed by: SURGERY

## 2024-10-30 PROCEDURE — 2500000005 HC RX 250 GENERAL PHARMACY W/O HCPCS

## 2024-10-30 PROCEDURE — 83690 ASSAY OF LIPASE: CPT | Performed by: EMERGENCY MEDICINE

## 2024-10-30 PROCEDURE — 94762 N-INVAS EAR/PLS OXIMTRY CONT: CPT

## 2024-10-30 PROCEDURE — 99223 1ST HOSP IP/OBS HIGH 75: CPT | Performed by: HOSPITALIST

## 2024-10-30 PROCEDURE — 71045 X-RAY EXAM CHEST 1 VIEW: CPT

## 2024-10-30 RX ORDER — ONDANSETRON HYDROCHLORIDE 2 MG/ML
8 INJECTION, SOLUTION INTRAVENOUS ONCE
Status: COMPLETED | OUTPATIENT
Start: 2024-10-30 | End: 2024-10-30

## 2024-10-30 RX ORDER — ONDANSETRON HYDROCHLORIDE 2 MG/ML
4 INJECTION, SOLUTION INTRAVENOUS EVERY 8 HOURS PRN
Status: DISCONTINUED | OUTPATIENT
Start: 2024-10-30 | End: 2024-10-30 | Stop reason: HOSPADM

## 2024-10-30 RX ORDER — MORPHINE SULFATE 2 MG/ML
2 INJECTION, SOLUTION INTRAMUSCULAR; INTRAVENOUS ONCE
Status: COMPLETED | OUTPATIENT
Start: 2024-10-30 | End: 2024-10-30

## 2024-10-30 RX ORDER — ONDANSETRON 4 MG/1
4 TABLET, ORALLY DISINTEGRATING ORAL EVERY 8 HOURS PRN
Status: DISCONTINUED | OUTPATIENT
Start: 2024-10-30 | End: 2024-10-30 | Stop reason: HOSPADM

## 2024-10-30 RX ORDER — ENOXAPARIN SODIUM 100 MG/ML
40 INJECTION SUBCUTANEOUS EVERY 24 HOURS
Status: DISCONTINUED | OUTPATIENT
Start: 2024-10-30 | End: 2024-10-30 | Stop reason: HOSPADM

## 2024-10-30 RX ORDER — SODIUM CHLORIDE AND POTASSIUM CHLORIDE 150; 900 MG/100ML; MG/100ML
100 INJECTION, SOLUTION INTRAVENOUS CONTINUOUS
Status: DISCONTINUED | OUTPATIENT
Start: 2024-10-30 | End: 2024-10-30 | Stop reason: HOSPADM

## 2024-10-30 RX ORDER — MORPHINE SULFATE 2 MG/ML
2 INJECTION, SOLUTION INTRAMUSCULAR; INTRAVENOUS EVERY 4 HOURS PRN
Status: DISCONTINUED | OUTPATIENT
Start: 2024-10-30 | End: 2024-10-30 | Stop reason: HOSPADM

## 2024-10-30 RX ORDER — ONDANSETRON 4 MG/1
4 TABLET, FILM COATED ORAL EVERY 8 HOURS PRN
Status: DISCONTINUED | OUTPATIENT
Start: 2024-10-30 | End: 2024-10-30 | Stop reason: CLARIF

## 2024-10-30 RX ORDER — PROCHLORPERAZINE EDISYLATE 5 MG/ML
5 INJECTION INTRAMUSCULAR; INTRAVENOUS EVERY 6 HOURS PRN
Status: DISCONTINUED | OUTPATIENT
Start: 2024-10-30 | End: 2024-10-30 | Stop reason: HOSPADM

## 2024-10-30 RX ORDER — DIATRIZOATE MEGLUMINE AND DIATRIZOATE SODIUM 660; 100 MG/ML; MG/ML
60 SOLUTION ORAL; RECTAL ONCE
Status: COMPLETED | OUTPATIENT
Start: 2024-10-30 | End: 2024-10-30

## 2024-10-30 RX ORDER — SODIUM CHLORIDE 9 MG/ML
125 INJECTION, SOLUTION INTRAVENOUS CONTINUOUS
Status: DISCONTINUED | OUTPATIENT
Start: 2024-10-30 | End: 2024-10-30 | Stop reason: HOSPADM

## 2024-10-30 RX ORDER — POLYETHYLENE GLYCOL 3350 17 G/17G
17 POWDER, FOR SOLUTION ORAL DAILY
Status: DISCONTINUED | OUTPATIENT
Start: 2024-10-30 | End: 2024-10-30 | Stop reason: HOSPADM

## 2024-10-30 RX ORDER — METOCLOPRAMIDE HYDROCHLORIDE 5 MG/ML
10 INJECTION INTRAMUSCULAR; INTRAVENOUS ONCE
Status: COMPLETED | OUTPATIENT
Start: 2024-10-30 | End: 2024-10-30

## 2024-10-30 RX ORDER — PANTOPRAZOLE SODIUM 40 MG/10ML
40 INJECTION, POWDER, LYOPHILIZED, FOR SOLUTION INTRAVENOUS DAILY
Status: DISCONTINUED | OUTPATIENT
Start: 2024-10-30 | End: 2024-10-30 | Stop reason: HOSPADM

## 2024-10-30 RX ORDER — FAMOTIDINE 10 MG/ML
40 INJECTION INTRAVENOUS ONCE
Status: COMPLETED | OUTPATIENT
Start: 2024-10-30 | End: 2024-10-30

## 2024-10-30 RX ORDER — PANTOPRAZOLE SODIUM 40 MG/10ML
40 INJECTION, POWDER, LYOPHILIZED, FOR SOLUTION INTRAVENOUS DAILY
Status: DISCONTINUED | OUTPATIENT
Start: 2024-10-30 | End: 2024-10-30

## 2024-10-30 RX ORDER — SODIUM CHLORIDE 9 MG/ML
INJECTION, SOLUTION INTRAMUSCULAR; INTRAVENOUS; SUBCUTANEOUS
Status: COMPLETED
Start: 2024-10-30 | End: 2024-10-30

## 2024-10-30 ASSESSMENT — PAIN - FUNCTIONAL ASSESSMENT: PAIN_FUNCTIONAL_ASSESSMENT: 0-10

## 2024-10-30 ASSESSMENT — COLUMBIA-SUICIDE SEVERITY RATING SCALE - C-SSRS
1. IN THE PAST MONTH, HAVE YOU WISHED YOU WERE DEAD OR WISHED YOU COULD GO TO SLEEP AND NOT WAKE UP?: NO
2. HAVE YOU ACTUALLY HAD ANY THOUGHTS OF KILLING YOURSELF?: NO
6. HAVE YOU EVER DONE ANYTHING, STARTED TO DO ANYTHING, OR PREPARED TO DO ANYTHING TO END YOUR LIFE?: NO

## 2024-10-30 ASSESSMENT — PAIN SCALES - GENERAL
PAINLEVEL_OUTOF10: 0 - NO PAIN
PAINLEVEL_OUTOF10: 5 - MODERATE PAIN
PAINLEVEL_OUTOF10: 5 - MODERATE PAIN
PAINLEVEL_OUTOF10: 0 - NO PAIN

## 2024-10-30 ASSESSMENT — HEART SCORE
TROPONIN: LESS THAN OR EQUAL TO NORMAL LIMIT
RISK FACTORS: 1-2 RISK FACTORS
HEART SCORE: 3
ECG: NORMAL
HISTORY: SLIGHTLY SUSPICIOUS
AGE: 65+

## 2024-10-30 ASSESSMENT — PAIN DESCRIPTION - DESCRIPTORS
DESCRIPTORS: HEAVINESS
DESCRIPTORS: HEAVINESS

## 2024-10-30 ASSESSMENT — PAIN DESCRIPTION - LOCATION: LOCATION: CHEST

## 2024-10-30 ASSESSMENT — PAIN DESCRIPTION - PAIN TYPE: TYPE: ACUTE PAIN

## 2024-11-01 LAB
ATRIAL RATE: 86 BPM
P AXIS: 72 DEGREES
P OFFSET: 207 MS
P ONSET: 160 MS
PR INTERVAL: 128 MS
Q ONSET: 224 MS
QRS COUNT: 14 BEATS
QRS DURATION: 90 MS
QT INTERVAL: 378 MS
QTC CALCULATION(BAZETT): 452 MS
QTC FREDERICIA: 426 MS
R AXIS: -31 DEGREES
T AXIS: 37 DEGREES
T OFFSET: 413 MS
VENTRICULAR RATE: 86 BPM

## 2024-11-06 ENCOUNTER — LAB (OUTPATIENT)
Dept: LAB | Facility: LAB | Age: 89
End: 2024-11-06
Payer: COMMERCIAL

## 2024-11-06 DIAGNOSIS — E03.9 ACQUIRED HYPOTHYROIDISM: ICD-10-CM

## 2024-11-06 DIAGNOSIS — N18.31 STAGE 3A CHRONIC KIDNEY DISEASE (MULTI): ICD-10-CM

## 2024-11-06 DIAGNOSIS — E53.8 VITAMIN B12 DEFICIENCY: ICD-10-CM

## 2024-11-06 LAB
ALBUMIN SERPL BCP-MCNC: 3.9 G/DL (ref 3.4–5)
ALP SERPL-CCNC: 70 U/L (ref 33–136)
ALT SERPL W P-5'-P-CCNC: 6 U/L (ref 7–45)
ANION GAP SERPL CALC-SCNC: 12 MMOL/L (ref 10–20)
AST SERPL W P-5'-P-CCNC: 11 U/L (ref 9–39)
BILIRUB SERPL-MCNC: 0.7 MG/DL (ref 0–1.2)
BUN SERPL-MCNC: 11 MG/DL (ref 6–23)
CALCIUM SERPL-MCNC: 9.8 MG/DL (ref 8.6–10.3)
CHLORIDE SERPL-SCNC: 97 MMOL/L (ref 98–107)
CO2 SERPL-SCNC: 33 MMOL/L (ref 21–32)
CREAT SERPL-MCNC: 1.02 MG/DL (ref 0.5–1.05)
EGFRCR SERPLBLD CKD-EPI 2021: 53 ML/MIN/1.73M*2
ERYTHROCYTE [DISTWIDTH] IN BLOOD BY AUTOMATED COUNT: 12.1 % (ref 11.5–14.5)
GLUCOSE SERPL-MCNC: 110 MG/DL (ref 74–99)
HCT VFR BLD AUTO: 46.2 % (ref 36–46)
HGB BLD-MCNC: 14.3 G/DL (ref 12–16)
MCH RBC QN AUTO: 28.9 PG (ref 26–34)
MCHC RBC AUTO-ENTMCNC: 31 G/DL (ref 32–36)
MCV RBC AUTO: 94 FL (ref 80–100)
NRBC BLD-RTO: 0 /100 WBCS (ref 0–0)
PLATELET # BLD AUTO: 575 X10*3/UL (ref 150–450)
POTASSIUM SERPL-SCNC: 4.6 MMOL/L (ref 3.5–5.3)
PROT SERPL-MCNC: 6.8 G/DL (ref 6.4–8.2)
RBC # BLD AUTO: 4.94 X10*6/UL (ref 4–5.2)
SODIUM SERPL-SCNC: 137 MMOL/L (ref 136–145)
T4 FREE SERPL-MCNC: 1.11 NG/DL (ref 0.61–1.12)
TSH SERPL-ACNC: 11.59 MIU/L (ref 0.44–3.98)
VIT B12 SERPL-MCNC: 1234 PG/ML (ref 211–911)
WBC # BLD AUTO: 10.4 X10*3/UL (ref 4.4–11.3)

## 2024-11-06 PROCEDURE — 80053 COMPREHEN METABOLIC PANEL: CPT

## 2024-11-06 PROCEDURE — 36415 COLL VENOUS BLD VENIPUNCTURE: CPT

## 2024-11-06 PROCEDURE — 84443 ASSAY THYROID STIM HORMONE: CPT

## 2024-11-06 PROCEDURE — 84439 ASSAY OF FREE THYROXINE: CPT

## 2024-11-06 PROCEDURE — 82607 VITAMIN B-12: CPT

## 2024-11-06 PROCEDURE — 85027 COMPLETE CBC AUTOMATED: CPT

## 2024-11-15 ENCOUNTER — APPOINTMENT (OUTPATIENT)
Dept: PRIMARY CARE | Facility: CLINIC | Age: 89
End: 2024-11-15
Payer: COMMERCIAL

## 2024-11-15 VITALS
DIASTOLIC BLOOD PRESSURE: 62 MMHG | WEIGHT: 141 LBS | BODY MASS INDEX: 26.64 KG/M2 | HEART RATE: 84 BPM | SYSTOLIC BLOOD PRESSURE: 124 MMHG | OXYGEN SATURATION: 96 %

## 2024-11-15 DIAGNOSIS — Z00.00 ROUTINE GENERAL MEDICAL EXAMINATION AT HEALTH CARE FACILITY: Primary | ICD-10-CM

## 2024-11-15 DIAGNOSIS — E78.49 FAMILIAL HYPERLIPIDEMIA: ICD-10-CM

## 2024-11-15 DIAGNOSIS — R13.19 ESOPHAGEAL DYSPHAGIA: ICD-10-CM

## 2024-11-15 DIAGNOSIS — E03.9 ACQUIRED HYPOTHYROIDISM: ICD-10-CM

## 2024-11-15 DIAGNOSIS — N18.31 STAGE 3A CHRONIC KIDNEY DISEASE (MULTI): ICD-10-CM

## 2024-11-15 DIAGNOSIS — K59.04 CHRONIC IDIOPATHIC CONSTIPATION: ICD-10-CM

## 2024-11-15 DIAGNOSIS — I10 ESSENTIAL (PRIMARY) HYPERTENSION: ICD-10-CM

## 2024-11-15 DIAGNOSIS — I10 ESSENTIAL HYPERTENSION: ICD-10-CM

## 2024-11-15 RX ORDER — TRIAMTERENE/HYDROCHLOROTHIAZID 37.5-25 MG
0.5 TABLET ORAL DAILY
Qty: 45 TABLET | Refills: 3 | Status: SHIPPED | OUTPATIENT
Start: 2024-11-15 | End: 2025-11-15

## 2024-11-15 ASSESSMENT — PATIENT HEALTH QUESTIONNAIRE - PHQ9
7. TROUBLE CONCENTRATING ON THINGS, SUCH AS READING THE NEWSPAPER OR WATCHING TELEVISION: NOT AT ALL
2. FEELING DOWN, DEPRESSED OR HOPELESS: MORE THAN HALF THE DAYS
6. FEELING BAD ABOUT YOURSELF - OR THAT YOU ARE A FAILURE OR HAVE LET YOURSELF OR YOUR FAMILY DOWN: SEVERAL DAYS
1. LITTLE INTEREST OR PLEASURE IN DOING THINGS: MORE THAN HALF THE DAYS
5. POOR APPETITE OR OVEREATING: MORE THAN HALF THE DAYS
3. TROUBLE FALLING OR STAYING ASLEEP OR SLEEPING TOO MUCH: NEARLY EVERY DAY
SUM OF ALL RESPONSES TO PHQ QUESTIONS 1-9: 13
2. FEELING DOWN, DEPRESSED OR HOPELESS: NEARLY EVERY DAY
9. THOUGHTS THAT YOU WOULD BE BETTER OFF DEAD, OR OF HURTING YOURSELF: NOT AT ALL
1. LITTLE INTEREST OR PLEASURE IN DOING THINGS: NEARLY EVERY DAY
SUM OF ALL RESPONSES TO PHQ9 QUESTIONS 1 AND 2: 6
8. MOVING OR SPEAKING SO SLOWLY THAT OTHER PEOPLE COULD HAVE NOTICED. OR THE OPPOSITE, BEING SO FIGETY OR RESTLESS THAT YOU HAVE BEEN MOVING AROUND A LOT MORE THAN USUAL: NOT AT ALL
10. IF YOU CHECKED OFF ANY PROBLEMS, HOW DIFFICULT HAVE THESE PROBLEMS MADE IT FOR YOU TO DO YOUR WORK, TAKE CARE OF THINGS AT HOME, OR GET ALONG WITH OTHER PEOPLE: SOMEWHAT DIFFICULT
SUM OF ALL RESPONSES TO PHQ9 QUESTIONS 1 AND 2: 4
4. FEELING TIRED OR HAVING LITTLE ENERGY: NEARLY EVERY DAY

## 2024-11-15 ASSESSMENT — ACTIVITIES OF DAILY LIVING (ADL)
BATHING: INDEPENDENT
GROCERY_SHOPPING: INDEPENDENT
MANAGING_FINANCES: INDEPENDENT
TAKING_MEDICATION: INDEPENDENT
DOING_HOUSEWORK: INDEPENDENT
DRESSING: INDEPENDENT

## 2024-11-15 NOTE — PROGRESS NOTES
Subjective   Reason for Visit: Nimisha Ingram is an 89 y.o. female here for a Medicare Wellness visit.     Past Medical, Surgical, and Family History reviewed and updated in chart.    Reviewed all medications by prescribing practitioner or clinical pharmacist (such as prescriptions, OTCs, herbal therapies and supplements) and documented in the medical record.    HPI  Acquired hypothyroidism NOS - Med works well without side effects. High TSH but normal FT4 on 88 mcg.  Will keep on same dose      Cervical pain (neck) - Comes and goes. Advil or Tylenol helps. Worse with the shoulder pain      Familial hyperlipidemia  and HTN - no meds and great HDL in May 2024. No Chest pain, palpitations, numbness, weakness, edema, claudications, or double vision/ loss of vision.  Some edema when she rides a lot.  Short of breath if she does too much. Does have weakness spells and lightheaded when she first gets up. Will cut Maxzide in half. CT of abdomen and CXR did show some basilar fibrosis that would explain some of the dyspnea.      Family history colon cancer - scope in 2016.  No blood      History of small bowel obstruction and in ER this time a few weeks ago.. Did not need admit.  No blood in the stool. No diarrhea or constipation or bloating now      Lumbar hernia ([place])  treated with surgery and doing well but having some bulging now. Left flank.   Will call if worse and I can refer back to surgery. Cannot lift much. Pain at times if she lays on that side.        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  Has been to Dr Daily.      Hearing loss - She wears hearing aids at times. Not very often as by herself. Not working well so will check      Osteoarthritis of shoulder - still has some pain but better with the injection. Follow up with Trice in Dr Christiansen's  office.. Bone on bone. Needs surgery and does not want it.       Presbyesophagus - has some times when she has to drink water to get food down.      Seizure disorder - no recurrence for years      B12 deficiency - noted last time. She is on B12 and good this time.      Stage 3A CKD - GFR stable at 53     LW and DPA no but will be sons and daughter  Pneumovax UTD       Patient Care Team:  Evaristo Borja MD as PCP - General  Evaristo Borja MD as PCP - [facility] [employer] PCP     Review of Systems    Objective   Vitals:  /62 (BP Location: Left arm, Patient Position: Sitting)   Pulse 84   Wt 64 kg (141 lb)   SpO2 96%   BMI 26.64 kg/m²       Physical Exam  Vitals reviewed.   Constitutional:       General: She is not in acute distress.     Appearance: Normal appearance.   HENT:      Head: Normocephalic.      Right Ear: Tympanic membrane, ear canal and external ear normal.      Left Ear: Tympanic membrane, ear canal and external ear normal.      Nose: Nose normal.      Mouth/Throat:      Mouth: Mucous membranes are moist.      Pharynx: Oropharynx is clear.   Eyes:      Extraocular Movements: Extraocular movements intact.      Conjunctiva/sclera: Conjunctivae normal.      Pupils: Pupils are equal, round, and reactive to light.   Neck:      Vascular: No carotid bruit.   Cardiovascular:      Rate and Rhythm: Normal rate and regular rhythm.      Pulses: Normal pulses.      Heart sounds: Normal heart sounds. No murmur heard.  Pulmonary:      Effort: Pulmonary effort is normal. No respiratory distress.      Breath sounds: Normal breath sounds.   Abdominal:      General: Abdomen is flat. Bowel sounds are normal. There is no distension.      Palpations: Abdomen is soft. There is no mass.      Tenderness: There is no abdominal tenderness.   Musculoskeletal:      Cervical back: Normal range of motion and neck supple. No tenderness.      Comments: Decreases ROM with abduction right shoulder with pain     Lymphadenopathy:      Cervical: No cervical adenopathy.   Skin:     General: Skin is warm and dry.      Findings: No rash.   Neurological:      General: No focal deficit present.      Mental Status: She is alert and oriented to person, place, and time.   Psychiatric:         Mood and Affect: Mood normal.         Thought Content: Thought content normal.         Judgment: Judgment normal.         Assessment & Plan  Essential (primary) hypertension    Orders:    triamterene-hydrochlorothiazid (Maxzide-25) 37.5-25 mg tablet; Take 0.5 tablets by mouth once daily. Take with food.    Acquired hypothyroidism         Chronic idiopathic constipation         Esophageal dysphagia         Essential hypertension    Orders:    Comprehensive Metabolic Panel; Future    Familial hyperlipidemia    Orders:    Lipid Panel; Future    Stage 3a chronic kidney disease (Multi)    Orders:    CBC; Future    Comprehensive Metabolic Panel; Future    Albumin-Creatinine Ratio, Urine Random; Future    Follow Up In Primary Care - Established; Future    Routine general medical examination at health care facility

## 2024-11-15 NOTE — ASSESSMENT & PLAN NOTE
Orders:    CBC; Future    Comprehensive Metabolic Panel; Future    Albumin-Creatinine Ratio, Urine Random; Future    Follow Up In Primary Care - Established; Future

## 2024-12-26 ENCOUNTER — APPOINTMENT (OUTPATIENT)
Dept: PRIMARY CARE | Facility: CLINIC | Age: 89
End: 2024-12-26
Payer: COMMERCIAL

## 2025-01-23 DIAGNOSIS — G40.909 SEIZURE DISORDER (MULTI): ICD-10-CM

## 2025-01-23 DIAGNOSIS — E03.9 ACQUIRED HYPOTHYROIDISM: ICD-10-CM

## 2025-01-23 RX ORDER — LAMOTRIGINE 25 MG/1
TABLET ORAL
Qty: 540 TABLET | Refills: 1 | OUTPATIENT
Start: 2025-01-23

## 2025-01-23 RX ORDER — LEVOTHYROXINE SODIUM 88 UG/1
TABLET ORAL
Qty: 90 TABLET | Refills: 1 | OUTPATIENT
Start: 2025-01-23

## 2025-01-28 ENCOUNTER — TELEPHONE (OUTPATIENT)
Dept: PRIMARY CARE | Facility: CLINIC | Age: 89
End: 2025-01-28
Payer: COMMERCIAL

## 2025-01-28 DIAGNOSIS — E03.9 ACQUIRED HYPOTHYROIDISM: ICD-10-CM

## 2025-01-28 DIAGNOSIS — G40.909 SEIZURE DISORDER (MULTI): ICD-10-CM

## 2025-01-28 RX ORDER — LAMOTRIGINE 25 MG/1
75 TABLET ORAL 2 TIMES DAILY
Qty: 180 TABLET | Refills: 1 | Status: SHIPPED | OUTPATIENT
Start: 2025-01-28 | End: 2025-03-29

## 2025-01-28 RX ORDER — LAMOTRIGINE 25 MG/1
75 TABLET ORAL 2 TIMES DAILY
Qty: 180 TABLET | Refills: 1 | Status: SHIPPED | OUTPATIENT
Start: 2025-01-28 | End: 2025-01-28 | Stop reason: SDUPTHER

## 2025-01-28 RX ORDER — LEVOTHYROXINE SODIUM 88 UG/1
88 TABLET ORAL DAILY
Qty: 90 TABLET | Refills: 0 | Status: SHIPPED | OUTPATIENT
Start: 2025-01-28 | End: 2025-01-28 | Stop reason: SDUPTHER

## 2025-01-28 RX ORDER — LEVOTHYROXINE SODIUM 88 UG/1
88 TABLET ORAL DAILY
Qty: 90 TABLET | Refills: 0 | Status: SHIPPED | OUTPATIENT
Start: 2025-01-28

## 2025-01-28 NOTE — TELEPHONE ENCOUNTER
Message from Lilia that patient is due for some refills, but doesn't have an appt with Dr. Dale until 3/17/25.  Can we send in her Thyroid med and seizure med?  Please call her

## 2025-01-28 NOTE — TELEPHONE ENCOUNTER
David to Lilia and let her know we will send in enough until her appt with Dr. Dale  Lamotrigine and Levothyroxine proposed to Dr. Soares

## 2025-03-17 ENCOUNTER — APPOINTMENT (OUTPATIENT)
Age: 89
End: 2025-03-17
Payer: COMMERCIAL

## 2025-03-17 ENCOUNTER — TELEPHONE (OUTPATIENT)
Age: 89
End: 2025-03-17

## 2025-03-17 VITALS
DIASTOLIC BLOOD PRESSURE: 86 MMHG | HEART RATE: 89 BPM | WEIGHT: 133.6 LBS | HEIGHT: 61 IN | BODY MASS INDEX: 25.22 KG/M2 | SYSTOLIC BLOOD PRESSURE: 138 MMHG | OXYGEN SATURATION: 97 %

## 2025-03-17 DIAGNOSIS — E78.49 FAMILIAL HYPERLIPIDEMIA: ICD-10-CM

## 2025-03-17 DIAGNOSIS — I10 ESSENTIAL (PRIMARY) HYPERTENSION: ICD-10-CM

## 2025-03-17 DIAGNOSIS — E03.9 ACQUIRED HYPOTHYROIDISM: ICD-10-CM

## 2025-03-17 DIAGNOSIS — G40.909 SEIZURE DISORDER (MULTI): ICD-10-CM

## 2025-03-17 DIAGNOSIS — N18.31 STAGE 3A CHRONIC KIDNEY DISEASE (MULTI): ICD-10-CM

## 2025-03-17 DIAGNOSIS — Z80.0 FAMILY HISTORY OF COLON CANCER: ICD-10-CM

## 2025-03-17 DIAGNOSIS — K56.609 SMALL BOWEL OBSTRUCTION (MULTI): ICD-10-CM

## 2025-03-17 DIAGNOSIS — Z76.89 ENCOUNTER TO ESTABLISH CARE: Primary | ICD-10-CM

## 2025-03-17 DIAGNOSIS — Z23 NEEDS FLU SHOT: ICD-10-CM

## 2025-03-17 DIAGNOSIS — I10 ESSENTIAL HYPERTENSION: ICD-10-CM

## 2025-03-17 PROCEDURE — 99215 OFFICE O/P EST HI 40 MIN: CPT | Performed by: STUDENT IN AN ORGANIZED HEALTH CARE EDUCATION/TRAINING PROGRAM

## 2025-03-17 PROCEDURE — 90673 RIV3 VACCINE NO PRESERV IM: CPT | Performed by: STUDENT IN AN ORGANIZED HEALTH CARE EDUCATION/TRAINING PROGRAM

## 2025-03-17 PROCEDURE — 3079F DIAST BP 80-89 MM HG: CPT | Performed by: STUDENT IN AN ORGANIZED HEALTH CARE EDUCATION/TRAINING PROGRAM

## 2025-03-17 PROCEDURE — G0008 ADMIN INFLUENZA VIRUS VAC: HCPCS | Performed by: STUDENT IN AN ORGANIZED HEALTH CARE EDUCATION/TRAINING PROGRAM

## 2025-03-17 PROCEDURE — 3075F SYST BP GE 130 - 139MM HG: CPT | Performed by: STUDENT IN AN ORGANIZED HEALTH CARE EDUCATION/TRAINING PROGRAM

## 2025-03-17 PROCEDURE — 1159F MED LIST DOCD IN RCRD: CPT | Performed by: STUDENT IN AN ORGANIZED HEALTH CARE EDUCATION/TRAINING PROGRAM

## 2025-03-17 PROCEDURE — 1036F TOBACCO NON-USER: CPT | Performed by: STUDENT IN AN ORGANIZED HEALTH CARE EDUCATION/TRAINING PROGRAM

## 2025-03-17 PROCEDURE — 1160F RVW MEDS BY RX/DR IN RCRD: CPT | Performed by: STUDENT IN AN ORGANIZED HEALTH CARE EDUCATION/TRAINING PROGRAM

## 2025-03-17 RX ORDER — LEVOTHYROXINE SODIUM 88 UG/1
88 TABLET ORAL DAILY
Qty: 90 TABLET | Refills: 3 | Status: SHIPPED | OUTPATIENT
Start: 2025-03-17

## 2025-03-17 RX ORDER — LAMOTRIGINE 25 MG/1
75 TABLET ORAL 2 TIMES DAILY
Qty: 540 TABLET | Refills: 3 | Status: SHIPPED | OUTPATIENT
Start: 2025-03-17

## 2025-03-17 RX ORDER — TRIAMTERENE/HYDROCHLOROTHIAZID 37.5-25 MG
0.5 TABLET ORAL DAILY
Qty: 45 TABLET | Refills: 3 | Status: SHIPPED | OUTPATIENT
Start: 2025-03-17 | End: 2026-03-17

## 2025-03-17 NOTE — PATIENT INSTRUCTIONS
Tylenol extra strength - 650mg twice daily, scheduled, it would be safe to take a mid day dose as well  Okay to taken advil or aleve as needed    Get your Flu and RSV and COVID vaccination

## 2025-03-17 NOTE — PROGRESS NOTES
Subjective:  Nimisha Ingram is a 89 y.o. female who presents to clinic today for Establish Care      She notes that she has a lot of lower abdominal pain. She was told she had blockage in her bowels and epigastric pain. No pain for the past week. Was seen in ER with [facility] back in October.  Unfortunately she does have a strong family history of colon cancer with her father and 2 siblings having colon cancer.  Additionally has a child who  of cancer.    Has had 3 seizures in her life. Had then around 10 years ago. Has been lamictal 75mg twice daily.    She also takes levothyroxine 88mcg daily.     She has done physical therapy 8x for her shoulder. She also has had 2 injections in her shoulder. Her only option is surgery for pain.     Review of Systems    Assessment/Plan:  Nimisha Ingram is a 89 y.o. female  who presents to clinic today to address the following issues:   1. Encounter to establish care        2. Small bowel obstruction (Multi)  Referral to General Surgery    CBC    CBC      3. Family history of colon cancer  Referral to General Surgery    CBC    Comprehensive Metabolic Panel    CBC    Comprehensive Metabolic Panel      4. Acquired hypothyroidism  levothyroxine (Synthroid, Levoxyl) 88 mcg tablet      5. Seizure disorder (Multi)  lamoTRIgine (LaMICtal) 25 mg tablet      6. Essential (primary) hypertension  triamterene-hydrochlorothiazid (Maxzide-25) 37.5-25 mg tablet      7. Needs flu shot  Flu vaccine, trivalent, preservative free, no egg protein, age 18y+ (Flublok)      8. Essential hypertension  Comprehensive Metabolic Panel    Comprehensive Metabolic Panel      9. Stage 3a chronic kidney disease (Multi)  Albumin-Creatinine Ratio, Urine Random    Albumin-Creatinine Ratio, Urine Random      10. Familial hyperlipidemia  Lipid Panel    Lipid Panel        Recent small bowel obstruction/family history of colon cancer/unexplained weight loss/abdominal pain:  Unfortunately do not feel  "comfortable without further evaluation by general surgery.  Explained to Nimisha that I am concerned that she could potentially have colon cancer with all of these different signs.  She is going to talk with surgery to determine what neck step should be.    I reviewed patient's chart and ordered labs and medications as needed based on chronic conditions.  Problem List Items Addressed This Visit       Acquired hypothyroidism    Relevant Medications    levothyroxine (Synthroid, Levoxyl) 88 mcg tablet    Essential hypertension    Relevant Orders    Comprehensive Metabolic Panel    Familial hyperlipidemia    Relevant Orders    Lipid Panel    Seizure disorder (Multi)    Relevant Medications    lamoTRIgine (LaMICtal) 25 mg tablet    Stage 3a chronic kidney disease (Multi)    Relevant Orders    Albumin-Creatinine Ratio, Urine Random    Small bowel obstruction (Multi)    Relevant Orders    Referral to General Surgery    CBC     Other Visit Diagnoses       Encounter to establish care    -  Primary    Family history of colon cancer        Relevant Orders    Referral to General Surgery    CBC    Comprehensive Metabolic Panel    Essential (primary) hypertension        Relevant Medications    triamterene-hydrochlorothiazid (Maxzide-25) 37.5-25 mg tablet    Needs flu shot        Relevant Orders    Flu vaccine, trivalent, preservative free, no egg protein, age 18y+ (Flublok) (Completed)            Patient Instructions   Tylenol extra strength - 650mg twice daily, scheduled, it would be safe to take a mid day dose as well  Okay to taken advil or aleve as needed    Get your Flu and RSV and COVID vaccination    Follow up: 3 months    Return precautions discussed.  An After Visit Summary was given to the patient.  All questions were answered and patient in agreement with plan.    Objective:  /86   Pulse 89   Ht 1.549 m (5' 1\")   Wt 60.6 kg (133 lb 9.6 oz)   SpO2 97%   BMI 25.24 kg/m²     Physical Exam  Vitals and nursing note " reviewed.   Constitutional:       General: She is not in acute distress.     Appearance: Normal appearance.   HENT:      Head: Normocephalic and atraumatic.      Mouth/Throat:      Mouth: Mucous membranes are moist.   Eyes:      General: No scleral icterus.        Right eye: No discharge.         Left eye: No discharge.      Extraocular Movements: Extraocular movements intact.      Conjunctiva/sclera: Conjunctivae normal.   Cardiovascular:      Rate and Rhythm: Normal rate and regular rhythm.   Pulmonary:      Effort: Pulmonary effort is normal. No respiratory distress.      Breath sounds: Normal breath sounds.   Abdominal:      General: Abdomen is flat. Bowel sounds are normal.      Palpations: Abdomen is soft.      Comments: Tenderness in suprapubic region and LLQ   Skin:     General: Skin is warm and dry.   Neurological:      General: No focal deficit present.      Mental Status: She is alert and oriented to person, place, and time.   Psychiatric:         Attention and Perception: Attention normal.         Mood and Affect: Mood normal.         Speech: Speech normal.         Behavior: Behavior normal.         Cognition and Memory: Cognition and memory normal.         Judgment: Judgment normal.         I spent 52 minutes in total time for this visit including all related clinical activities before, during, and after the visit excluding other billable activities/procedure time.     Mendy Dale MD

## 2025-03-17 NOTE — TELEPHONE ENCOUNTER
PATIENT CALLED, WAS IN TODAY AND TOLD TO GET  STRENGTH TYLENOL 650 MG.   THAT  IS ARTHRITIS STRENGTH  ?   X STRENGTH  MG .   PATIENT JUST WANTS TO MAKE SURE  WHICH TYLENOL SHE IS TO GET  ?

## 2025-03-21 ENCOUNTER — APPOINTMENT (OUTPATIENT)
Dept: SURGERY | Facility: CLINIC | Age: 89
End: 2025-03-21
Payer: COMMERCIAL

## 2025-04-01 ENCOUNTER — APPOINTMENT (OUTPATIENT)
Dept: SURGERY | Facility: CLINIC | Age: 89
End: 2025-04-01
Payer: COMMERCIAL

## 2025-04-01 ENCOUNTER — PREP FOR PROCEDURE (OUTPATIENT)
Dept: SURGERY | Facility: CLINIC | Age: 89
End: 2025-04-01

## 2025-04-01 ENCOUNTER — DOCUMENTATION (OUTPATIENT)
Dept: SURGERY | Facility: CLINIC | Age: 89
End: 2025-04-01

## 2025-04-01 VITALS
BODY MASS INDEX: 25.45 KG/M2 | SYSTOLIC BLOOD PRESSURE: 146 MMHG | DIASTOLIC BLOOD PRESSURE: 72 MMHG | HEIGHT: 61 IN | WEIGHT: 134.8 LBS | HEART RATE: 86 BPM

## 2025-04-01 DIAGNOSIS — Z80.0 FAMILY HISTORY OF COLON CANCER: ICD-10-CM

## 2025-04-01 DIAGNOSIS — R63.4 WEIGHT LOSS: Primary | ICD-10-CM

## 2025-04-01 DIAGNOSIS — K56.609 SMALL BOWEL OBSTRUCTION (MULTI): ICD-10-CM

## 2025-04-01 DIAGNOSIS — Z12.31 BREAST CANCER SCREENING BY MAMMOGRAM: Primary | ICD-10-CM

## 2025-04-01 PROCEDURE — 99215 OFFICE O/P EST HI 40 MIN: CPT | Performed by: SURGERY

## 2025-04-01 PROCEDURE — 1159F MED LIST DOCD IN RCRD: CPT | Performed by: SURGERY

## 2025-04-01 PROCEDURE — 3077F SYST BP >= 140 MM HG: CPT | Performed by: SURGERY

## 2025-04-01 PROCEDURE — 1036F TOBACCO NON-USER: CPT | Performed by: SURGERY

## 2025-04-01 PROCEDURE — 3078F DIAST BP <80 MM HG: CPT | Performed by: SURGERY

## 2025-04-01 RX ORDER — SODIUM CHLORIDE, SODIUM LACTATE, POTASSIUM CHLORIDE, CALCIUM CHLORIDE 600; 310; 30; 20 MG/100ML; MG/100ML; MG/100ML; MG/100ML
50 INJECTION, SOLUTION INTRAVENOUS CONTINUOUS
OUTPATIENT
Start: 2025-04-01 | End: 2025-04-02

## 2025-04-01 RX ORDER — ONDANSETRON HYDROCHLORIDE 2 MG/ML
4 INJECTION, SOLUTION INTRAVENOUS ONCE AS NEEDED
OUTPATIENT
Start: 2025-04-01

## 2025-04-01 NOTE — PROGRESS NOTES
Notified patient of Colonoscopy   scheduled on 4-17-25  .Instructions reviewed. Advised patient [facility] will contact them 24 hours before surgery with arrival time. Pt voices understanding. Aziza Arredondo MA.

## 2025-04-01 NOTE — LETTER
2025     Mendy Dale MD  [address] [address]    Patient: Nimisha Ingram   YOB: 1935   Date of Visit: 2025       Dear Dr. Mendy Dale MD:    Thank you for referring Nimisha Ingram to me for evaluation. Below are my notes for this consultation.  If you have questions, please do not hesitate to call me. I look forward to following your patient along with you.       Sincerely,     Kaylin Monson MD      CC: No Recipients  ______________________________________________________________________________________    General Surgery Consultation    Patient: Nimisha Ingram  : 1935  MRN: [id number]  Date of Consultation: 25    Primary Care Provider: Mendy Dale MD  Referring Provider: Mendy Dale MD    Chief Complaint: Request for evaluation by new primary care.  Patient is alert but is seen with her son.  There are multiple issues which are addressed and this took almost an hour to deal with all of them.    History of Present Illness: Nimisha Ingram is a 89 y.o. old female seen at the request of Mendy Dale MD for evaluation of multiple abdominal related issues.  1.  Recurrent small bowel obstructions.  Patient had an incarcerated hernia in the past and underwent small bowel resection.  She has had a couple of admissions for small bowel obstruction at her anastomosis which quickly resolved.  Small bowel follow-through was done which showed no small bowel abnormalities and no stricture at the anastomosis.  It was always presumed to be a large piece of food that needed to digest before we move through.  The patient has ill fitting dentures.  She will intermittently feel bloated at home and she has been cutting back on her eating per her son to try and avoid admission.  As a result of the above patient has been losing weight.    2.  Weight loss suspect this is multifactorial.  Patient is probably self-limiting her intake to try and avoid  admission.  She has no significant stricture in the small bowel.    3.  There is a family history of colon cancer and the patient has not had a colonoscopy since 2018.  Her primary care since she is losing weight it might be a good idea to do a colonoscopy.  She is here with her son to discuss the alternatives.    4.  She also has not had screening mammograms in the bed.  I discussed different to go as far as tube do the more invasive colonoscopy it might not be unreasonable to get mammograms given the family history of multiple family members with breast cancer.  I have recommended that she have her daughter get genetic testing to see if they need to do anything further for the rest of the family members.    Medical History:  Past Medical History:   Diagnosis Date   • Acute gastritis without bleeding 07/14/2020    Acute gastritis without hemorrhage, unspecified gastritis type   • Disorder of thyroid, unspecified 11/19/2019    Thyroid trouble   • Encounter for general adult medical examination without abnormal findings 07/14/2020    Medicare annual wellness visit, initial   • Personal history of other diseases of the digestive system 07/13/2021    History of constipation   • Personal history of other diseases of the musculoskeletal system and connective tissue 11/19/2019    History of arthritis   • Personal history of other diseases of the nervous system and sense organs 01/11/2022    History of mastoiditis   • Personal history of other diseases of urinary system     History of kidney disease   • Unspecified convulsions (Multi) 11/19/2019    Seizures       Surgical History:  Past Surgical History:   Procedure Laterality Date   • COLONOSCOPY  08/03/2016    DR BRUCE   • OTHER SURGICAL HISTORY  11/19/2019    Back surgery   • OTHER SURGICAL HISTORY  11/19/2019    Ovarian surgery   • OTHER SURGICAL HISTORY  11/19/2019    Knee surgery   • OTHER SURGICAL HISTORY  01/13/2020    Small bowel resection   • OTHER SURGICAL  HISTORY  01/13/2020    Hysterectomy vaginal   • OTHER SURGICAL HISTORY  01/13/2020    Cataract surgery   • OTHER SURGICAL HISTORY  07/14/2020    Hernia repair       Home Medications:  Prior to Admission medications    Medication Sig Start Date End Date Taking? Authorizing Provider   aspirin 81 mg chewable tablet Chew 1 tablet (81 mg) once daily. 1/28/18  Yes Historical Provider, MD   cholecalciferol (Vitamin D-3) 1,250 mcg (50,000 unit) capsule Take 1 capsule (50,000 Units) by mouth 1 (one) time per week.   Yes Historical Provider, MD   cyanocobalamin (Vitamin B-12) 1,000 mcg tablet Take 1 tablet (1,000 mcg) by mouth once daily. 7/13/21  Yes Historical Provider, MD   lamoTRIgine (LaMICtal) 25 mg tablet Take 3 tablets (75 mg) by mouth 2 times a day. 3/17/25  Yes Mendy Dale MD   levothyroxine (Synthroid, Levoxyl) 88 mcg tablet Take 1 tablet (88 mcg) by mouth once daily. 3/17/25  Yes Mendy Dale MD   multivitamin tablet Take 1 tablet by mouth once daily. 1/13/20  Yes Historical Provider, MD   triamterene-hydrochlorothiazid (Maxzide-25) 37.5-25 mg tablet Take 0.5 tablets by mouth once daily. Take with food. 3/17/25 3/17/26 Yes Mendy Dale MD       Allergies:  Allergies   Allergen Reactions   • Cefazolin Unknown   • Fish Containing Products Unknown   • Iodinated Contrast Media Unknown     is allergic to cefazolin, fish containing products, and iodinated contrast media.    Family History:   Family History   Problem Relation Name Age of Onset   • Colon cancer Mother     • Prostate cancer Father     • Cancer Sister     • Aneurysm Sister         Social History:  Social History     Socioeconomic History   • Marital status:    Tobacco Use   • Smoking status: Never   • Smokeless tobacco: Former     Types: Chew     Quit date: 1960   Vaping Use   • Vaping status: Never Used   Substance and Sexual Activity   • Alcohol use: Never   • Drug use: Never   • Sexual activity: Defer       ROS:  Constitutional:  no  "fever, sweats, and chills  Cardiovascular: No chest pain  Respiratory: No cough or shortness of breath  Gastrointestinal: As in HPI  Genitourinary: no dysuria  Musculoskeletal: no weakness or swelling  Integumentary: no rashes  Neurological: no confusion  Endocrine: no heat or cold intolerance  Heme/Lymph: no easy bruising or bleeding    Objective:  /72   Pulse 86   Ht 1.549 m (5' 1\")   Wt 61.1 kg (134 lb 12.8 oz)   BMI 25.47 kg/m²     Physical Exam:  Constitutional: No acute distress, conversant, pleasant  Neurologic: alert and oriented  Psych: appropriate affect  Ears, Nose, Mouth and Throat: mucus membranes moist  Pulmonary: No labored breathing  Cardiovascular: Regular rate and rhythm  Abdomen: soft, non-distended, non-tender  Musculoskeletal: Moves all extremities, no edema  Skin: warm and dry    Imaging:  I have reviewed her small bowel follow-through's as well as her CTs.  There is no overt evidence of a cancer and no narrowing of her small bowel    Assessment and Plan: Nimisha Ingram is a 89 y.o. old female with multiple GI issues.  We spent approximately 60 minutes discussing all these various factors and the alternatives available especially given her age.  I reassured her that her small bowel is not narrowed and she does not need surgery on this and it is probably just the fact that she is not chewing her food well with her ill fitting dentures that chunks of food cannot passed through the relative stiff line of the staples.  I explained to her that we just have to then wait for it to digest.  I suggested that she try eating softer foods, cutting up the meats etc and chewing her food well.  Given the negative reinforcement she has had, I recommended that she eat 6 small meals a day such as making breakfast and eating half of it and then 3 hours later eat the other half etc. for each meal.  I also recommended drinking some Jacob for the protein and trying Ensure or boost but cutting it with ice " cream to make a milkshake as she probably will not like it because it is so sweet.  She does like jemal greens and I have cautioned her that she can eat them but then to spit out the part that she cannot chew.  These measures will probably treat her weight loss as well as her recurrent small bowel obstructions.  As far as her family history of colon cancer, she is concerned there may be something in her abdomen that is contributing to her weight loss and after discussion she and her son do wish to proceed with colonoscopy.  Given her advanced age and multiple medical problems we are doing her in the OR with anesthesia.  She understands we did not see anything large on the CT but CT is not the best test to look for colon polyps and/or cancers.  She understands all her risk for the procedures are elevated given her advanced age and weight loss.  I do think we need to do this to reassure her that there is nothing in there and that can free her up to institute the above listed suggestions.  We discussed the indication for colonoscopy as well as the differential diagnosis including anything from no findings all the way up to a colon cancer.  We reviewed the prep as well as the risks and potential complications including but not limited to bleeding, infection, reaction to the anesthetic, stroke MI and/or death.  We discussed the risk of perforation or need for completion barium studies.  All questions were answered and she asked us to proceed. .     Kaylin Monson MD  4/1/2025

## 2025-04-01 NOTE — PROGRESS NOTES
General Surgery Consultation    Patient: Nimisha Ingram  : 1935  MRN: [id number]  Date of Consultation: 25    Primary Care Provider: Mendy Dale MD  Referring Provider: Mendy Dale MD    Chief Complaint: Request for evaluation by new primary care.  Patient is alert but is seen with her son.  There are multiple issues which are addressed and this took almost an hour to deal with all of them.    History of Present Illness: Nimisha Ingram is a 89 y.o. old female seen at the request of Mendy Dale MD for evaluation of multiple abdominal related issues.  1.  Recurrent small bowel obstructions.  Patient had an incarcerated hernia in the past and underwent small bowel resection.  She has had a couple of admissions for small bowel obstruction at her anastomosis which quickly resolved.  Small bowel follow-through was done which showed no small bowel abnormalities and no stricture at the anastomosis.  It was always presumed to be a large piece of food that needed to digest before we move through.  The patient has ill fitting dentures.  She will intermittently feel bloated at home and she has been cutting back on her eating per her son to try and avoid admission.  As a result of the above patient has been losing weight.    2.  Weight loss suspect this is multifactorial.  Patient is probably self-limiting her intake to try and avoid admission.  She has no significant stricture in the small bowel.    3.  There is a family history of colon cancer and the patient has not had a colonoscopy since 2018.  Her primary care since she is losing weight it might be a good idea to do a colonoscopy.  She is here with her son to discuss the alternatives.    4.  She also has not had screening mammograms in the bed.  I discussed different to go as far as tube do the more invasive colonoscopy it might not be unreasonable to get mammograms given the family history of multiple family members with breast cancer.  I have  recommended that she have her daughter get genetic testing to see if they need to do anything further for the rest of the family members.    Medical History:  Past Medical History:   Diagnosis Date    Acute gastritis without bleeding 07/14/2020    Acute gastritis without hemorrhage, unspecified gastritis type    Disorder of thyroid, unspecified 11/19/2019    Thyroid trouble    Encounter for general adult medical examination without abnormal findings 07/14/2020    Medicare annual wellness visit, initial    Personal history of other diseases of the digestive system 07/13/2021    History of constipation    Personal history of other diseases of the musculoskeletal system and connective tissue 11/19/2019    History of arthritis    Personal history of other diseases of the nervous system and sense organs 01/11/2022    History of mastoiditis    Personal history of other diseases of urinary system     History of kidney disease    Unspecified convulsions (Multi) 11/19/2019    Seizures       Surgical History:  Past Surgical History:   Procedure Laterality Date    COLONOSCOPY  08/03/2016    DR BRUCE    OTHER SURGICAL HISTORY  11/19/2019    Back surgery    OTHER SURGICAL HISTORY  11/19/2019    Ovarian surgery    OTHER SURGICAL HISTORY  11/19/2019    Knee surgery    OTHER SURGICAL HISTORY  01/13/2020    Small bowel resection    OTHER SURGICAL HISTORY  01/13/2020    Hysterectomy vaginal    OTHER SURGICAL HISTORY  01/13/2020    Cataract surgery    OTHER SURGICAL HISTORY  07/14/2020    Hernia repair       Home Medications:  Prior to Admission medications    Medication Sig Start Date End Date Taking? Authorizing Provider   aspirin 81 mg chewable tablet Chew 1 tablet (81 mg) once daily. 1/28/18  Yes Historical Provider, MD   cholecalciferol (Vitamin D-3) 1,250 mcg (50,000 unit) capsule Take 1 capsule (50,000 Units) by mouth 1 (one) time per week.   Yes Historical Provider, MD   cyanocobalamin (Vitamin B-12) 1,000 mcg tablet Take  "1 tablet (1,000 mcg) by mouth once daily. 7/13/21  Yes Historical Provider, MD   lamoTRIgine (LaMICtal) 25 mg tablet Take 3 tablets (75 mg) by mouth 2 times a day. 3/17/25  Yes Mendy Dale MD   levothyroxine (Synthroid, Levoxyl) 88 mcg tablet Take 1 tablet (88 mcg) by mouth once daily. 3/17/25  Yes Mendy Dale MD   multivitamin tablet Take 1 tablet by mouth once daily. 1/13/20  Yes Historical Provider, MD   triamterene-hydrochlorothiazid (Maxzide-25) 37.5-25 mg tablet Take 0.5 tablets by mouth once daily. Take with food. 3/17/25 3/17/26 Yes Mendy Dale MD       Allergies:  Allergies   Allergen Reactions    Cefazolin Unknown    Fish Containing Products Unknown    Iodinated Contrast Media Unknown     is allergic to cefazolin, fish containing products, and iodinated contrast media.    Family History:   Family History   Problem Relation Name Age of Onset    Colon cancer Mother      Prostate cancer Father      Cancer Sister      Aneurysm Sister         Social History:  Social History     Socioeconomic History    Marital status:    Tobacco Use    Smoking status: Never    Smokeless tobacco: Former     Types: Chew     Quit date: 1960   Vaping Use    Vaping status: Never Used   Substance and Sexual Activity    Alcohol use: Never    Drug use: Never    Sexual activity: Defer       ROS:  Constitutional:  no fever, sweats, and chills  Cardiovascular: No chest pain  Respiratory: No cough or shortness of breath  Gastrointestinal: As in HPI  Genitourinary: no dysuria  Musculoskeletal: no weakness or swelling  Integumentary: no rashes  Neurological: no confusion  Endocrine: no heat or cold intolerance  Heme/Lymph: no easy bruising or bleeding    Objective:  /72   Pulse 86   Ht 1.549 m (5' 1\")   Wt 61.1 kg (134 lb 12.8 oz)   BMI 25.47 kg/m²     Physical Exam:  Constitutional: No acute distress, conversant, pleasant  Neurologic: alert and oriented  Psych: appropriate affect  Ears, Nose, Mouth and Throat: " mucus membranes moist  Pulmonary: No labored breathing  Cardiovascular: Regular rate and rhythm  Abdomen: soft, non-distended, non-tender  Musculoskeletal: Moves all extremities, no edema  Skin: warm and dry    Imaging:  I have reviewed her small bowel follow-through's as well as her CTs.  There is no overt evidence of a cancer and no narrowing of her small bowel    Assessment and Plan: Nimisha Ingram is a 89 y.o. old female with multiple GI issues.  We spent approximately 60 minutes discussing all these various factors and the alternatives available especially given her age.  I reassured her that her small bowel is not narrowed and she does not need surgery on this and it is probably just the fact that she is not chewing her food well with her ill fitting dentures that chunks of food cannot passed through the relative stiff line of the staples.  I explained to her that we just have to then wait for it to digest.  I suggested that she try eating softer foods, cutting up the meats etc and chewing her food well.  Given the negative reinforcement she has had, I recommended that she eat 6 small meals a day such as making breakfast and eating half of it and then 3 hours later eat the other half etc. for each meal.  I also recommended drinking some Jacob for the protein and trying Ensure or boost but cutting it with ice cream to make a milkshake as she probably will not like it because it is so sweet.  She does like jemal greens and I have cautioned her that she can eat them but then to spit out the part that she cannot chew.  These measures will probably treat her weight loss as well as her recurrent small bowel obstructions.  As far as her family history of colon cancer, she is concerned there may be something in her abdomen that is contributing to her weight loss and after discussion she and her son do wish to proceed with colonoscopy.  Given her advanced age and multiple medical problems we are doing her in the OR  with anesthesia.  She understands we did not see anything large on the CT but CT is not the best test to look for colon polyps and/or cancers.  She understands all her risk for the procedures are elevated given her advanced age and weight loss.  I do think we need to do this to reassure her that there is nothing in there and that can free her up to institute the above listed suggestions.  We discussed the indication for colonoscopy as well as the differential diagnosis including anything from no findings all the way up to a colon cancer.  We reviewed the prep as well as the risks and potential complications including but not limited to bleeding, infection, reaction to the anesthetic, stroke MI and/or death.  We discussed the risk of perforation or need for completion barium studies.  All questions were answered and she asked us to proceed. .     Kaylin Monson MD  4/1/2025

## 2025-04-01 NOTE — H&P (VIEW-ONLY)
General Surgery Consultation    Patient: Nimisha Ingram  : 1935  MRN: [id number]  Date of Consultation: 25    Primary Care Provider: Mendy Dale MD  Referring Provider: Mendy Dale MD    Chief Complaint: Request for evaluation by new primary care.  Patient is alert but is seen with her son.  There are multiple issues which are addressed and this took almost an hour to deal with all of them.    History of Present Illness: Nimisha Ingram is a 89 y.o. old female seen at the request of Mendy Dale MD for evaluation of multiple abdominal related issues.  1.  Recurrent small bowel obstructions.  Patient had an incarcerated hernia in the past and underwent small bowel resection.  She has had a couple of admissions for small bowel obstruction at her anastomosis which quickly resolved.  Small bowel follow-through was done which showed no small bowel abnormalities and no stricture at the anastomosis.  It was always presumed to be a large piece of food that needed to digest before we move through.  The patient has ill fitting dentures.  She will intermittently feel bloated at home and she has been cutting back on her eating per her son to try and avoid admission.  As a result of the above patient has been losing weight.    2.  Weight loss suspect this is multifactorial.  Patient is probably self-limiting her intake to try and avoid admission.  She has no significant stricture in the small bowel.    3.  There is a family history of colon cancer and the patient has not had a colonoscopy since 2018.  Her primary care since she is losing weight it might be a good idea to do a colonoscopy.  She is here with her son to discuss the alternatives.    4.  She also has not had screening mammograms in the bed.  I discussed different to go as far as tube do the more invasive colonoscopy it might not be unreasonable to get mammograms given the family history of multiple family members with breast cancer.  I have  recommended that she have her daughter get genetic testing to see if they need to do anything further for the rest of the family members.    Medical History:  Past Medical History:   Diagnosis Date    Acute gastritis without bleeding 07/14/2020    Acute gastritis without hemorrhage, unspecified gastritis type    Disorder of thyroid, unspecified 11/19/2019    Thyroid trouble    Encounter for general adult medical examination without abnormal findings 07/14/2020    Medicare annual wellness visit, initial    Personal history of other diseases of the digestive system 07/13/2021    History of constipation    Personal history of other diseases of the musculoskeletal system and connective tissue 11/19/2019    History of arthritis    Personal history of other diseases of the nervous system and sense organs 01/11/2022    History of mastoiditis    Personal history of other diseases of urinary system     History of kidney disease    Unspecified convulsions (Multi) 11/19/2019    Seizures       Surgical History:  Past Surgical History:   Procedure Laterality Date    COLONOSCOPY  08/03/2016    DR BRUCE    OTHER SURGICAL HISTORY  11/19/2019    Back surgery    OTHER SURGICAL HISTORY  11/19/2019    Ovarian surgery    OTHER SURGICAL HISTORY  11/19/2019    Knee surgery    OTHER SURGICAL HISTORY  01/13/2020    Small bowel resection    OTHER SURGICAL HISTORY  01/13/2020    Hysterectomy vaginal    OTHER SURGICAL HISTORY  01/13/2020    Cataract surgery    OTHER SURGICAL HISTORY  07/14/2020    Hernia repair       Home Medications:  Prior to Admission medications    Medication Sig Start Date End Date Taking? Authorizing Provider   aspirin 81 mg chewable tablet Chew 1 tablet (81 mg) once daily. 1/28/18  Yes Historical Provider, MD   cholecalciferol (Vitamin D-3) 1,250 mcg (50,000 unit) capsule Take 1 capsule (50,000 Units) by mouth 1 (one) time per week.   Yes Historical Provider, MD   cyanocobalamin (Vitamin B-12) 1,000 mcg tablet Take  "1 tablet (1,000 mcg) by mouth once daily. 7/13/21  Yes Historical Provider, MD   lamoTRIgine (LaMICtal) 25 mg tablet Take 3 tablets (75 mg) by mouth 2 times a day. 3/17/25  Yes Mendy Dale MD   levothyroxine (Synthroid, Levoxyl) 88 mcg tablet Take 1 tablet (88 mcg) by mouth once daily. 3/17/25  Yes Mendy Dale MD   multivitamin tablet Take 1 tablet by mouth once daily. 1/13/20  Yes Historical Provider, MD   triamterene-hydrochlorothiazid (Maxzide-25) 37.5-25 mg tablet Take 0.5 tablets by mouth once daily. Take with food. 3/17/25 3/17/26 Yes Mendy Dale MD       Allergies:  Allergies   Allergen Reactions    Cefazolin Unknown    Fish Containing Products Unknown    Iodinated Contrast Media Unknown     is allergic to cefazolin, fish containing products, and iodinated contrast media.    Family History:   Family History   Problem Relation Name Age of Onset    Colon cancer Mother      Prostate cancer Father      Cancer Sister      Aneurysm Sister         Social History:  Social History     Socioeconomic History    Marital status:    Tobacco Use    Smoking status: Never    Smokeless tobacco: Former     Types: Chew     Quit date: 1960   Vaping Use    Vaping status: Never Used   Substance and Sexual Activity    Alcohol use: Never    Drug use: Never    Sexual activity: Defer       ROS:  Constitutional:  no fever, sweats, and chills  Cardiovascular: No chest pain  Respiratory: No cough or shortness of breath  Gastrointestinal: As in HPI  Genitourinary: no dysuria  Musculoskeletal: no weakness or swelling  Integumentary: no rashes  Neurological: no confusion  Endocrine: no heat or cold intolerance  Heme/Lymph: no easy bruising or bleeding    Objective:  /72   Pulse 86   Ht 1.549 m (5' 1\")   Wt 61.1 kg (134 lb 12.8 oz)   BMI 25.47 kg/m²     Physical Exam:  Constitutional: No acute distress, conversant, pleasant  Neurologic: alert and oriented  Psych: appropriate affect  Ears, Nose, Mouth and Throat: " mucus membranes moist  Pulmonary: No labored breathing  Cardiovascular: Regular rate and rhythm  Abdomen: soft, non-distended, non-tender  Musculoskeletal: Moves all extremities, no edema  Skin: warm and dry    Imaging:  I have reviewed her small bowel follow-through's as well as her CTs.  There is no overt evidence of a cancer and no narrowing of her small bowel    Assessment and Plan: Nimisha Ingram is a 89 y.o. old female with multiple GI issues.  We spent approximately 60 minutes discussing all these various factors and the alternatives available especially given her age.  I reassured her that her small bowel is not narrowed and she does not need surgery on this and it is probably just the fact that she is not chewing her food well with her ill fitting dentures that chunks of food cannot passed through the relative stiff line of the staples.  I explained to her that we just have to then wait for it to digest.  I suggested that she try eating softer foods, cutting up the meats etc and chewing her food well.  Given the negative reinforcement she has had, I recommended that she eat 6 small meals a day such as making breakfast and eating half of it and then 3 hours later eat the other half etc. for each meal.  I also recommended drinking some Jacob for the protein and trying Ensure or boost but cutting it with ice cream to make a milkshake as she probably will not like it because it is so sweet.  She does like jemal greens and I have cautioned her that she can eat them but then to spit out the part that she cannot chew.  These measures will probably treat her weight loss as well as her recurrent small bowel obstructions.  As far as her family history of colon cancer, she is concerned there may be something in her abdomen that is contributing to her weight loss and after discussion she and her son do wish to proceed with colonoscopy.  Given her advanced age and multiple medical problems we are doing her in the OR  with anesthesia.  She understands we did not see anything large on the CT but CT is not the best test to look for colon polyps and/or cancers.  She understands all her risk for the procedures are elevated given her advanced age and weight loss.  I do think we need to do this to reassure her that there is nothing in there and that can free her up to institute the above listed suggestions.  We discussed the indication for colonoscopy as well as the differential diagnosis including anything from no findings all the way up to a colon cancer.  We reviewed the prep as well as the risks and potential complications including but not limited to bleeding, infection, reaction to the anesthetic, stroke MI and/or death.  We discussed the risk of perforation or need for completion barium studies.  All questions were answered and she asked us to proceed. .     Kaylin Monson MD  4/1/2025

## 2025-04-17 ENCOUNTER — ANESTHESIA EVENT (OUTPATIENT)
Dept: OPERATING ROOM | Facility: HOSPITAL | Age: 89
End: 2025-04-17
Payer: COMMERCIAL

## 2025-04-17 ENCOUNTER — ANESTHESIA (OUTPATIENT)
Dept: OPERATING ROOM | Facility: HOSPITAL | Age: 89
End: 2025-04-17
Payer: COMMERCIAL

## 2025-04-17 ENCOUNTER — HOSPITAL ENCOUNTER (OUTPATIENT)
Dept: OPERATING ROOM | Facility: HOSPITAL | Age: 89
Setting detail: OUTPATIENT SURGERY
Discharge: HOME | End: 2025-04-17
Payer: COMMERCIAL

## 2025-04-17 VITALS
DIASTOLIC BLOOD PRESSURE: 69 MMHG | SYSTOLIC BLOOD PRESSURE: 135 MMHG | BODY MASS INDEX: 25.43 KG/M2 | WEIGHT: 134.7 LBS | HEIGHT: 61 IN | OXYGEN SATURATION: 96 % | RESPIRATION RATE: 17 BRPM | TEMPERATURE: 97.7 F | HEART RATE: 65 BPM

## 2025-04-17 DIAGNOSIS — R63.4 WEIGHT LOSS: ICD-10-CM

## 2025-04-17 PROCEDURE — 3700000001 HC GENERAL ANESTHESIA TIME - INITIAL BASE CHARGE: Performed by: ANESTHESIOLOGY

## 2025-04-17 PROCEDURE — 45380 COLONOSCOPY AND BIOPSY: CPT | Performed by: SURGERY

## 2025-04-17 PROCEDURE — 2500000005 HC RX 250 GENERAL PHARMACY W/O HCPCS: Performed by: ANESTHESIOLOGY

## 2025-04-17 PROCEDURE — 2500000004 HC RX 250 GENERAL PHARMACY W/ HCPCS (ALT 636 FOR OP/ED): Mod: JZ | Performed by: ANESTHESIOLOGY

## 2025-04-17 PROCEDURE — 3600000002 HC OR TIME - INITIAL BASE CHARGE - PROCEDURE LEVEL TWO: Performed by: ANESTHESIOLOGY

## 2025-04-17 PROCEDURE — 7100000009 HC PHASE TWO TIME - INITIAL BASE CHARGE: Performed by: ANESTHESIOLOGY

## 2025-04-17 PROCEDURE — 2500000004 HC RX 250 GENERAL PHARMACY W/ HCPCS (ALT 636 FOR OP/ED): Mod: JZ | Performed by: SURGERY

## 2025-04-17 PROCEDURE — 3700000002 HC GENERAL ANESTHESIA TIME - EACH INCREMENTAL 1 MINUTE: Performed by: ANESTHESIOLOGY

## 2025-04-17 PROCEDURE — 3600000007 HC OR TIME - EACH INCREMENTAL 1 MINUTE - PROCEDURE LEVEL TWO: Performed by: ANESTHESIOLOGY

## 2025-04-17 PROCEDURE — 7100000010 HC PHASE TWO TIME - EACH INCREMENTAL 1 MINUTE: Performed by: ANESTHESIOLOGY

## 2025-04-17 RX ORDER — MIDAZOLAM HYDROCHLORIDE 1 MG/ML
INJECTION INTRAMUSCULAR; INTRAVENOUS AS NEEDED
Status: DISCONTINUED | OUTPATIENT
Start: 2025-04-17 | End: 2025-04-17

## 2025-04-17 RX ORDER — SODIUM CHLORIDE, SODIUM LACTATE, POTASSIUM CHLORIDE, CALCIUM CHLORIDE 600; 310; 30; 20 MG/100ML; MG/100ML; MG/100ML; MG/100ML
50 INJECTION, SOLUTION INTRAVENOUS CONTINUOUS
Status: DISCONTINUED | OUTPATIENT
Start: 2025-04-17 | End: 2025-04-18 | Stop reason: HOSPADM

## 2025-04-17 RX ORDER — SODIUM CHLORIDE, SODIUM LACTATE, POTASSIUM CHLORIDE, CALCIUM CHLORIDE 600; 310; 30; 20 MG/100ML; MG/100ML; MG/100ML; MG/100ML
20 INJECTION, SOLUTION INTRAVENOUS ONCE
Status: DISCONTINUED | OUTPATIENT
Start: 2025-04-17 | End: 2025-04-17 | Stop reason: HOSPADM

## 2025-04-17 RX ORDER — ONDANSETRON HYDROCHLORIDE 2 MG/ML
4 INJECTION, SOLUTION INTRAVENOUS ONCE AS NEEDED
Status: DISCONTINUED | OUTPATIENT
Start: 2025-04-17 | End: 2025-04-18 | Stop reason: HOSPADM

## 2025-04-17 RX ORDER — PROPOFOL 10 MG/ML
INJECTION, EMULSION INTRAVENOUS CONTINUOUS PRN
Status: DISCONTINUED | OUTPATIENT
Start: 2025-04-17 | End: 2025-04-17

## 2025-04-17 RX ADMIN — GLUCAGON 1 MG: KIT at 07:34

## 2025-04-17 RX ADMIN — PROPOFOL 50 MCG/KG/MIN: 10 INJECTION, EMULSION INTRAVENOUS at 07:32

## 2025-04-17 RX ADMIN — Medication 20 MG: at 07:32

## 2025-04-17 RX ADMIN — MIDAZOLAM HYDROCHLORIDE 2 MG: 1 INJECTION, SOLUTION INTRAMUSCULAR; INTRAVENOUS at 07:32

## 2025-04-17 RX ADMIN — SODIUM CHLORIDE, SODIUM LACTATE, POTASSIUM CHLORIDE, AND CALCIUM CHLORIDE 50 ML/HR: .6; .31; .03; .02 INJECTION, SOLUTION INTRAVENOUS at 06:24

## 2025-04-17 ASSESSMENT — PAIN - FUNCTIONAL ASSESSMENT
PAIN_FUNCTIONAL_ASSESSMENT: 0-10
PAIN_FUNCTIONAL_ASSESSMENT: UNABLE TO SELF-REPORT
PAIN_FUNCTIONAL_ASSESSMENT: 0-10

## 2025-04-17 ASSESSMENT — PAIN SCALES - GENERAL
PAINLEVEL_OUTOF10: 0 - NO PAIN

## 2025-04-17 ASSESSMENT — COLUMBIA-SUICIDE SEVERITY RATING SCALE - C-SSRS
6. HAVE YOU EVER DONE ANYTHING, STARTED TO DO ANYTHING, OR PREPARED TO DO ANYTHING TO END YOUR LIFE?: NO
1. IN THE PAST MONTH, HAVE YOU WISHED YOU WERE DEAD OR WISHED YOU COULD GO TO SLEEP AND NOT WAKE UP?: NO
2. HAVE YOU ACTUALLY HAD ANY THOUGHTS OF KILLING YOURSELF?: NO

## 2025-04-17 NOTE — ANESTHESIA PREPROCEDURE EVALUATION
Patient: Nimisha Ingram    Procedure Information       Date/Time: 04/17/25 0730    Scheduled providers: Kaylin Monson MD; Timi Vidal MD; AVRIL Jeffery; Janell Shetty RN    Procedure: COLONOSCOPY    Location: [facility] OR            Relevant Problems   Cardiac   (+) Chest pain, unspecified type   (+) Essential hypertension   (+) Familial hyperlipidemia      Neuro   (+) Seizure disorder (Multi)      GI   (+) Esophageal dysphagia      Endocrine   (+) Acquired hypothyroidism      Musculoskeletal   (+) Osteoarthritis of right shoulder       Clinical information reviewed:   Tobacco  Allergies  Meds   Med Hx  Surg Hx  OB Status  Fam Hx  Soc   Hx        NPO Detail:  NPO/Void Status  Carbohydrate Drink Given Prior to Surgery? : N  Date of Last Liquid: 04/16/25  Time of Last Liquid: 2230  Date of Last Solid: 04/16/25  Time of Last Solid: 0800  Last Intake Type: Clear fluids  Time of Last Void: 0600         Physical Exam    Airway  Mallampati: II  TM distance: >3 FB  Neck ROM: full  Mouth opening: 3 or more finger widths     Cardiovascular   Rhythm: regular     Dental    Pulmonary    Abdominal            Anesthesia Plan    History of general anesthesia?: yes  History of complications of general anesthesia?: no    ASA 2     MAC

## 2025-04-17 NOTE — ANESTHESIA POSTPROCEDURE EVALUATION
Patient: Nimisha Ingram    Procedure Summary       Date: 04/17/25 Room / Location: [facility] OR    Anesthesia Start: 0731 Anesthesia Stop: 0802    Procedure: COLONOSCOPY Diagnosis: Weight loss    Scheduled Providers: Kaylin Monson MD; Timi Vidal MD; AVRIL Jeffery; Janell Shetty RN Responsible Provider: Timi Vidal MD    Anesthesia Type: MAC ASA Status: 2            Anesthesia Type: MAC    Vitals Value Taken Time   /77 04/17/25 08:30   Temp 36.5 °C (97.7 °F) 04/17/25 07:57   Pulse 65 04/17/25 08:45   Resp 17 04/17/25 08:45   SpO2 96 % 04/17/25 08:45       Anesthesia Post Evaluation    Patient location during evaluation: bedside  Patient participation: complete - patient participated  Level of consciousness: sleepy but conscious  Pain management: adequate  Airway patency: patent  Cardiovascular status: acceptable  Respiratory status: acceptable  Hydration status: acceptable  Postoperative Nausea and Vomiting: none        No notable events documented.

## 2025-04-18 ENCOUNTER — TELEPHONE (OUTPATIENT)
Dept: SURGERY | Facility: CLINIC | Age: 89
End: 2025-04-18
Payer: COMMERCIAL

## 2025-04-18 NOTE — TELEPHONE ENCOUNTER
Spoke to patient after Colonoscopy. Pt denies fever, chills, nausea, and vomiting. Pt had no questions at this time.  Provided office number to patient for any questions.

## 2025-04-21 ENCOUNTER — HOSPITAL ENCOUNTER (OUTPATIENT)
Dept: RADIOLOGY | Facility: HOSPITAL | Age: 89
Discharge: HOME | End: 2025-04-21
Payer: COMMERCIAL

## 2025-04-21 DIAGNOSIS — Z12.31 BREAST CANCER SCREENING BY MAMMOGRAM: ICD-10-CM

## 2025-04-21 PROCEDURE — 77063 BREAST TOMOSYNTHESIS BI: CPT | Performed by: RADIOLOGY

## 2025-04-21 PROCEDURE — 77067 SCR MAMMO BI INCL CAD: CPT | Performed by: RADIOLOGY

## 2025-04-21 PROCEDURE — 77067 SCR MAMMO BI INCL CAD: CPT

## 2025-04-23 ENCOUNTER — HOSPITAL ENCOUNTER (OUTPATIENT)
Dept: RADIOLOGY | Facility: EXTERNAL LOCATION | Age: 89
Discharge: HOME | End: 2025-04-23

## 2025-04-29 LAB
LABORATORY COMMENT REPORT: NORMAL
PATH REPORT.FINAL DX SPEC: NORMAL
PATH REPORT.GROSS SPEC: NORMAL
PATH REPORT.RELEVANT HX SPEC: NORMAL
PATH REPORT.TOTAL CANCER: NORMAL

## 2025-06-02 ENCOUNTER — APPOINTMENT (OUTPATIENT)
Age: 89
End: 2025-06-02
Payer: COMMERCIAL

## 2025-06-02 VITALS
WEIGHT: 137.4 LBS | HEART RATE: 71 BPM | HEIGHT: 61 IN | SYSTOLIC BLOOD PRESSURE: 148 MMHG | DIASTOLIC BLOOD PRESSURE: 68 MMHG | OXYGEN SATURATION: 96 % | BODY MASS INDEX: 25.94 KG/M2

## 2025-06-02 DIAGNOSIS — K45.8 HERNIA OF FLANK: Primary | ICD-10-CM

## 2025-06-02 DIAGNOSIS — R13.19 ESOPHAGEAL DYSPHAGIA: ICD-10-CM

## 2025-06-02 PROCEDURE — 1160F RVW MEDS BY RX/DR IN RCRD: CPT | Performed by: STUDENT IN AN ORGANIZED HEALTH CARE EDUCATION/TRAINING PROGRAM

## 2025-06-02 PROCEDURE — 1159F MED LIST DOCD IN RCRD: CPT | Performed by: STUDENT IN AN ORGANIZED HEALTH CARE EDUCATION/TRAINING PROGRAM

## 2025-06-02 PROCEDURE — 3077F SYST BP >= 140 MM HG: CPT | Performed by: STUDENT IN AN ORGANIZED HEALTH CARE EDUCATION/TRAINING PROGRAM

## 2025-06-02 PROCEDURE — 1036F TOBACCO NON-USER: CPT | Performed by: STUDENT IN AN ORGANIZED HEALTH CARE EDUCATION/TRAINING PROGRAM

## 2025-06-02 PROCEDURE — 3078F DIAST BP <80 MM HG: CPT | Performed by: STUDENT IN AN ORGANIZED HEALTH CARE EDUCATION/TRAINING PROGRAM

## 2025-06-02 PROCEDURE — 99214 OFFICE O/P EST MOD 30 MIN: CPT | Performed by: STUDENT IN AN ORGANIZED HEALTH CARE EDUCATION/TRAINING PROGRAM

## 2025-06-02 ASSESSMENT — PATIENT HEALTH QUESTIONNAIRE - PHQ9
1. LITTLE INTEREST OR PLEASURE IN DOING THINGS: NOT AT ALL
SUM OF ALL RESPONSES TO PHQ9 QUESTIONS 1 AND 2: 0
2. FEELING DOWN, DEPRESSED OR HOPELESS: NOT AT ALL

## 2025-06-02 NOTE — PROGRESS NOTES
Subjective:  Nimisha Ingram is a 89 y.o. female who presents to clinic today for Vaginal Bleeding (Intermittent spotting, hx of bladder prolapse )      She was having some vaginal bleeding. She has a pessary ring and was previously cleaning it, then she couldn't get it out. Pessary was stuck in for 3 months. She notes a brown blood not red.     Her doctor retired from [employer]. Then saw Dr. Whitman and was told that she didn't need general exams.     She has been wanting to increase food intake and has gained a bit of weight. She hasn't had any vomiting in a few weeks.     She is still having having difficulty with swallowing. She occasionally has to stop and drink to get food down. Some pain.     Review of Systems    Assessment/Plan:  Nimisha Ingram is a 89 y.o. female with a history of hypothyroidism, esophageal dysphagia, hyperlipidemia, hernia, prior seizure who presents to clinic today to address the following issues:   1. Hernia of flank  Referral to General Surgery      2. Esophageal dysphagia  Referral to General Surgery        Nimisha is an 89-year-old female who recently had colonoscopy with Dr. Saba due to unexplained weight loss and history of colon cancer in family. Unfortunately she is experiencing dysphagia with choking sensation and having to swallow water sometimes get her food down.  I believe, that she likely needs EGD with possible dilation versus other imaging such as barium swallow.  Additionally patient has large flank recurrent hernia which is causing her pain and so I feel like it would be more valuable to see general surgeon then GI at this point.  Referral was placed.    Additionally she had an episode of vaginal bleeding and her pessary was stuck.  She has since been able to remove pessary and all symptoms have resolved with no further bleeding.  I offered examination but she declined at this time.  We discussed to not reuse this pessary and if she feels  "she needs it we need to follow back up with OB.  Problem List Items Addressed This Visit       Esophageal dysphagia    Relevant Orders    Referral to General Surgery     Other Visit Diagnoses         Hernia of flank    -  Primary    Relevant Orders    Referral to General Surgery            There are no Patient Instructions on file for this visit.    Follow up: 3 months    Return precautions discussed.  An After Visit Summary was given to the patient.  All questions were answered and patient in agreement with plan.    Objective:  /68   Pulse 71   Ht 1.549 m (5' 1\")   Wt 62.3 kg (137 lb 6.4 oz)   SpO2 96%   BMI 25.96 kg/m²     Physical Exam  Vitals and nursing note reviewed.   Constitutional:       General: She is not in acute distress.     Appearance: Normal appearance.   HENT:      Head: Normocephalic and atraumatic.      Mouth/Throat:      Mouth: Mucous membranes are moist.   Eyes:      General: No scleral icterus.        Right eye: No discharge.         Left eye: No discharge.      Extraocular Movements: Extraocular movements intact.      Conjunctiva/sclera: Conjunctivae normal.   Cardiovascular:      Rate and Rhythm: Normal rate and regular rhythm.   Pulmonary:      Effort: Pulmonary effort is normal. No respiratory distress.      Breath sounds: Normal breath sounds.   Skin:     General: Skin is warm and dry.   Neurological:      General: No focal deficit present.      Mental Status: She is alert and oriented to person, place, and time.   Psychiatric:         Attention and Perception: Attention normal.         Mood and Affect: Mood normal.         Speech: Speech normal.         Behavior: Behavior normal.         Cognition and Memory: Cognition and memory normal.         Judgment: Judgment normal.         I spent 29 minutes in total time for this visit including all related clinical activities before, during, and after the visit excluding other billable activities/procedure time.     Mendy Dale, " MD

## 2025-06-03 ENCOUNTER — APPOINTMENT (OUTPATIENT)
Dept: RADIOLOGY | Facility: HOSPITAL | Age: 89
End: 2025-06-03
Payer: COMMERCIAL

## 2025-06-03 ENCOUNTER — HOSPITAL ENCOUNTER (EMERGENCY)
Facility: HOSPITAL | Age: 89
Discharge: HOME | End: 2025-06-03
Attending: EMERGENCY MEDICINE
Payer: COMMERCIAL

## 2025-06-03 ENCOUNTER — APPOINTMENT (OUTPATIENT)
Dept: CARDIOLOGY | Facility: HOSPITAL | Age: 89
End: 2025-06-03
Payer: COMMERCIAL

## 2025-06-03 VITALS
TEMPERATURE: 98.1 F | RESPIRATION RATE: 24 BRPM | HEIGHT: 61 IN | WEIGHT: 137 LBS | DIASTOLIC BLOOD PRESSURE: 56 MMHG | OXYGEN SATURATION: 92 % | SYSTOLIC BLOOD PRESSURE: 143 MMHG | HEART RATE: 82 BPM | BODY MASS INDEX: 25.86 KG/M2

## 2025-06-03 DIAGNOSIS — M94.0 COSTOCHONDRITIS: Primary | ICD-10-CM

## 2025-06-03 LAB
ALBUMIN SERPL BCP-MCNC: 4.1 G/DL (ref 3.4–5)
ALP SERPL-CCNC: 63 U/L (ref 33–136)
ALT SERPL W P-5'-P-CCNC: 6 U/L (ref 7–45)
ANION GAP SERPL CALC-SCNC: 12 MMOL/L (ref 10–20)
AST SERPL W P-5'-P-CCNC: 11 U/L (ref 9–39)
BASOPHILS # BLD AUTO: 0.02 X10*3/UL (ref 0–0.1)
BASOPHILS NFR BLD AUTO: 0.2 %
BILIRUB SERPL-MCNC: 0.8 MG/DL (ref 0–1.2)
BNP SERPL-MCNC: 138 PG/ML (ref 0–99)
BUN SERPL-MCNC: 18 MG/DL (ref 6–23)
CALCIUM SERPL-MCNC: 9.3 MG/DL (ref 8.6–10.3)
CARDIAC TROPONIN I PNL SERPL HS: 6 NG/L (ref 0–13)
CARDIAC TROPONIN I PNL SERPL HS: 6 NG/L (ref 0–13)
CHLORIDE SERPL-SCNC: 100 MMOL/L (ref 98–107)
CO2 SERPL-SCNC: 27 MMOL/L (ref 21–32)
CREAT SERPL-MCNC: 0.98 MG/DL (ref 0.5–1.05)
D DIMER PPP FEU-MCNC: 724 NG/ML FEU
EGFRCR SERPLBLD CKD-EPI 2021: 55 ML/MIN/1.73M*2
EOSINOPHIL # BLD AUTO: 0.21 X10*3/UL (ref 0–0.4)
EOSINOPHIL NFR BLD AUTO: 2.2 %
ERYTHROCYTE [DISTWIDTH] IN BLOOD BY AUTOMATED COUNT: 13.1 % (ref 11.5–14.5)
GLUCOSE SERPL-MCNC: 138 MG/DL (ref 74–99)
HCT VFR BLD AUTO: 38.4 % (ref 36–46)
HGB BLD-MCNC: 12.5 G/DL (ref 12–16)
IMM GRANULOCYTES # BLD AUTO: 0.02 X10*3/UL (ref 0–0.5)
IMM GRANULOCYTES NFR BLD AUTO: 0.2 % (ref 0–0.9)
LYMPHOCYTES # BLD AUTO: 1.39 X10*3/UL (ref 0.8–3)
LYMPHOCYTES NFR BLD AUTO: 14.5 %
MAGNESIUM SERPL-MCNC: 2.11 MG/DL (ref 1.6–2.4)
MCH RBC QN AUTO: 28.9 PG (ref 26–34)
MCHC RBC AUTO-ENTMCNC: 32.6 G/DL (ref 32–36)
MCV RBC AUTO: 89 FL (ref 80–100)
MONOCYTES # BLD AUTO: 1.69 X10*3/UL (ref 0.05–0.8)
MONOCYTES NFR BLD AUTO: 17.7 %
NEUTROPHILS # BLD AUTO: 6.24 X10*3/UL (ref 1.6–5.5)
NEUTROPHILS NFR BLD AUTO: 65.2 %
NRBC BLD-RTO: 0 /100 WBCS (ref 0–0)
PLATELET # BLD AUTO: 367 X10*3/UL (ref 150–450)
POTASSIUM SERPL-SCNC: 3.7 MMOL/L (ref 3.5–5.3)
PROT SERPL-MCNC: 7.1 G/DL (ref 6.4–8.2)
RBC # BLD AUTO: 4.33 X10*6/UL (ref 4–5.2)
SODIUM SERPL-SCNC: 135 MMOL/L (ref 136–145)
WBC # BLD AUTO: 9.6 X10*3/UL (ref 4.4–11.3)

## 2025-06-03 PROCEDURE — 84484 ASSAY OF TROPONIN QUANT: CPT | Performed by: EMERGENCY MEDICINE

## 2025-06-03 PROCEDURE — 96374 THER/PROPH/DIAG INJ IV PUSH: CPT

## 2025-06-03 PROCEDURE — 85379 FIBRIN DEGRADATION QUANT: CPT | Performed by: EMERGENCY MEDICINE

## 2025-06-03 PROCEDURE — 93005 ELECTROCARDIOGRAM TRACING: CPT

## 2025-06-03 PROCEDURE — 36415 COLL VENOUS BLD VENIPUNCTURE: CPT | Performed by: EMERGENCY MEDICINE

## 2025-06-03 PROCEDURE — 2550000001 HC RX 255 CONTRASTS: Performed by: EMERGENCY MEDICINE

## 2025-06-03 PROCEDURE — 83735 ASSAY OF MAGNESIUM: CPT | Performed by: EMERGENCY MEDICINE

## 2025-06-03 PROCEDURE — 85025 COMPLETE CBC W/AUTO DIFF WBC: CPT | Performed by: EMERGENCY MEDICINE

## 2025-06-03 PROCEDURE — 71045 X-RAY EXAM CHEST 1 VIEW: CPT

## 2025-06-03 PROCEDURE — 71045 X-RAY EXAM CHEST 1 VIEW: CPT | Performed by: RADIOLOGY

## 2025-06-03 PROCEDURE — 99285 EMERGENCY DEPT VISIT HI MDM: CPT | Mod: 25 | Performed by: EMERGENCY MEDICINE

## 2025-06-03 PROCEDURE — 83880 ASSAY OF NATRIURETIC PEPTIDE: CPT | Performed by: EMERGENCY MEDICINE

## 2025-06-03 PROCEDURE — 82565 ASSAY OF CREATININE: CPT | Performed by: EMERGENCY MEDICINE

## 2025-06-03 PROCEDURE — 71275 CT ANGIOGRAPHY CHEST: CPT

## 2025-06-03 PROCEDURE — 2500000004 HC RX 250 GENERAL PHARMACY W/ HCPCS (ALT 636 FOR OP/ED): Performed by: EMERGENCY MEDICINE

## 2025-06-03 RX ORDER — KETOROLAC TROMETHAMINE 30 MG/ML
15 INJECTION, SOLUTION INTRAMUSCULAR; INTRAVENOUS ONCE
Status: COMPLETED | OUTPATIENT
Start: 2025-06-03 | End: 2025-06-03

## 2025-06-03 RX ORDER — EVENING PRIMROSE OIL 500 MG
45 CAPSULE ORAL DAILY
COMMUNITY

## 2025-06-03 RX ORDER — KETOROLAC TROMETHAMINE 10 MG/1
10 TABLET, FILM COATED ORAL EVERY 6 HOURS PRN
Qty: 20 TABLET | Refills: 0 | Status: SHIPPED | OUTPATIENT
Start: 2025-06-03 | End: 2025-06-08

## 2025-06-03 RX ADMIN — IOHEXOL 69 ML: 350 INJECTION, SOLUTION INTRAVENOUS at 19:46

## 2025-06-03 RX ADMIN — KETOROLAC TROMETHAMINE 15 MG: 30 INJECTION, SOLUTION INTRAMUSCULAR at 20:39

## 2025-06-03 ASSESSMENT — PAIN SCALES - GENERAL
PAINLEVEL_OUTOF10: 5 - MODERATE PAIN
PAINLEVEL_OUTOF10: 8

## 2025-06-03 ASSESSMENT — PAIN - FUNCTIONAL ASSESSMENT
PAIN_FUNCTIONAL_ASSESSMENT: 0-10
PAIN_FUNCTIONAL_ASSESSMENT: 0-10

## 2025-06-03 ASSESSMENT — PAIN DESCRIPTION - LOCATION
LOCATION: CHEST
LOCATION: CHEST

## 2025-06-03 ASSESSMENT — VISUAL ACUITY: OU: 1

## 2025-06-03 ASSESSMENT — PAIN DESCRIPTION - ORIENTATION: ORIENTATION: MID

## 2025-06-03 NOTE — ED PROVIDER NOTES
Chest pain, shortness of breath.  This 89-year-old white female presents to the ED with complaint of chest pain symptoms that started last night in the evening she states that symptoms have been relatively since onset she states that she has a pressure in the substernal area as well as shortness of breath.  Her symptoms seem to get worse with activity improved with rest.  She denies any history of recent illness, cough, congestion, fever, chills, night sweats.  She denies any history of COPD or CHF.  She denies any new leg pain or swelling.  She does admit to history of herniated disc in the lumbar spine.      History provided by:  Patient and relative   used: No         Physical Exam  Vitals and nursing note reviewed.   Constitutional:       General: She is awake.      Appearance: Normal appearance. She is normal weight.   HENT:      Head: Normocephalic and atraumatic.      Right Ear: Hearing and external ear normal.      Left Ear: Hearing and external ear normal.      Nose: Nose normal. No congestion or rhinorrhea.      Mouth/Throat:      Lips: Pink.      Mouth: Mucous membranes are moist.      Pharynx: Oropharynx is clear. Uvula midline. No oropharyngeal exudate or posterior oropharyngeal erythema.   Eyes:      General: Lids are normal. Vision grossly intact.         Right eye: No discharge.         Left eye: No discharge.      Extraocular Movements: Extraocular movements intact.      Conjunctiva/sclera: Conjunctivae normal.      Pupils: Pupils are equal, round, and reactive to light.   Cardiovascular:      Rate and Rhythm: Normal rate and regular rhythm.      Pulses: Normal pulses.      Heart sounds: Normal heart sounds. No murmur heard.     No friction rub. No gallop.   Pulmonary:      Effort: Pulmonary effort is normal. No respiratory distress.      Breath sounds: Normal breath sounds. No stridor. No wheezing, rhonchi or rales.   Chest:      Chest wall: No tenderness.   Abdominal:       General: Abdomen is flat. Bowel sounds are normal. There is no distension.      Palpations: Abdomen is soft. There is no mass.      Tenderness: There is no abdominal tenderness. There is no guarding or rebound.      Hernia: No hernia is present.      Comments: Patient has a benign abdominal exam.   Musculoskeletal:         General: No swelling, tenderness, deformity or signs of injury. Normal range of motion.      Cervical back: Full passive range of motion without pain, normal range of motion and neck supple.      Right lower leg: Normal. No edema.      Left lower leg: Normal. No edema.   Skin:     General: Skin is warm and dry.      Capillary Refill: Capillary refill takes less than 2 seconds.      Coloration: Skin is not jaundiced or pale.      Findings: No bruising, erythema, lesion or rash.   Neurological:      General: No focal deficit present.      Mental Status: She is alert and oriented to person, place, and time.      GCS: GCS eye subscore is 4. GCS verbal subscore is 5. GCS motor subscore is 6.      Cranial Nerves: Cranial nerves 2-12 are intact. No cranial nerve deficit.      Sensory: Sensation is intact. No sensory deficit.      Motor: Motor function is intact. No weakness.      Coordination: Coordination is intact. Coordination normal.      Gait: Gait is intact.      Deep Tendon Reflexes: Reflexes normal.   Psychiatric:         Attention and Perception: Attention and perception normal.         Mood and Affect: Mood and affect normal.         Speech: Speech normal.         Behavior: Behavior normal. Behavior is cooperative.         Thought Content: Thought content normal.         Cognition and Memory: Cognition and memory normal.         Judgment: Judgment normal.          Labs Reviewed   CBC WITH AUTO DIFFERENTIAL - Abnormal       Result Value    WBC 9.6      nRBC 0.0      RBC 4.33      Hemoglobin 12.5      Hematocrit 38.4      MCV 89      MCH 28.9      MCHC 32.6      RDW 13.1      Platelets 367       Neutrophils % 65.2      Immature Granulocytes %, Automated 0.2      Lymphocytes % 14.5      Monocytes % 17.7      Eosinophils % 2.2      Basophils % 0.2      Neutrophils Absolute 6.24 (*)     Immature Granulocytes Absolute, Automated 0.02      Lymphocytes Absolute 1.39      Monocytes Absolute 1.69 (*)     Eosinophils Absolute 0.21      Basophils Absolute 0.02     COMPREHENSIVE METABOLIC PANEL - Abnormal    Glucose 138 (*)     Sodium 135 (*)     Potassium 3.7      Chloride 100      Bicarbonate 27      Anion Gap 12      Urea Nitrogen 18      Creatinine 0.98      eGFR 55 (*)     Calcium 9.3      Albumin 4.1      Alkaline Phosphatase 63      Total Protein 7.1      AST 11      Bilirubin, Total 0.8      ALT 6 (*)    D-DIMER, VTE EXCLUSION - Abnormal    D-Dimer, Quantitative VTE Exclusion 724 (*)     Narrative:     The VTE Exclusion D-Dimer assay is reported in ng/mL Fibrinogen Equivalent Units (FEU).    Per 's instructions for use, a value of less than 500 ng/mL (FEU) may help to exclude DVT or PE in outpatients when the assay is used with a clinical pretest probability assessment.([facility] must utilize and document eCalc 'Wells Score Deep Vein Thrombosis Risk' for DVT exclusion only. Emergency Department should utilize  Guidelines for Emergency Department Use of the VTE Exclusion D-Dimer and Clinical Pretest probability assessment model for DVT or PE exclusion.)   B-TYPE NATRIURETIC PEPTIDE - Abnormal     (*)     Narrative:        <100 pg/mL - Heart failure unlikely  100-299 pg/mL - Intermediate probability of acute heart                  failure exacerbation. Correlate with clinical                  context and patient history.    >=300 pg/mL - Heart Failure likely. Correlate with clinical                  context and patient history.    BNP testing is performed using different testing methodology at [facility] than at other [facility] hospitals. Direct result comparisons should only be made  within the same method.      MAGNESIUM - Normal    Magnesium 2.11     SERIAL TROPONIN-INITIAL - Normal    Troponin I, High Sensitivity 6      Narrative:     Less than 99th percentile of normal range cutoff-  Female and children under 18 years old <14 ng/L; Male <21 ng/L: Negative  Repeat testing should be performed if clinically indicated.     Female and children under 18 years old 14-50 ng/L; Male 21-50 ng/L:  Consistent with possible cardiac damage and possible increased clinical   risk. Serial measurements may help to assess extent of myocardial damage.     >50 ng/L: Consistent with cardiac damage, increased clinical risk and  myocardial infarction. Serial measurements may help assess extent of   myocardial damage.      NOTE: Children less than 1 year old may have higher baseline troponin   levels and results should be interpreted in conjunction with the overall   clinical context.     NOTE: Troponin I testing is performed using a different   testing methodology at [facility] than at other   [facility]. Direct result comparisons should only   be made within the same method.   SERIAL TROPONIN, 1 HOUR - Normal    Troponin I, High Sensitivity 6      Narrative:     Less than 99th percentile of normal range cutoff-  Female and children under 18 years old <14 ng/L; Male <21 ng/L: Negative  Repeat testing should be performed if clinically indicated.     Female and children under 18 years old 14-50 ng/L; Male 21-50 ng/L:  Consistent with possible cardiac damage and possible increased clinical   risk. Serial measurements may help to assess extent of myocardial damage.     >50 ng/L: Consistent with cardiac damage, increased clinical risk and  myocardial infarction. Serial measurements may help assess extent of   myocardial damage.      NOTE: Children less than 1 year old may have higher baseline troponin   levels and results should be interpreted in conjunction with the overall   clinical context.      NOTE: Troponin I testing is performed using a different   testing methodology at [facility] than at other   [facility] hospitals. Direct result comparisons should only   be made within the same method.   TROPONIN SERIES- (INITIAL, 1 HR)    Narrative:     The following orders were created for panel order Troponin I Series, High Sensitivity (0, 1 HR).  Procedure                               Abnormality         Status                     ---------                               -----------         ------                     Troponin I, High Sensiti...[[id number]]  Normal              Final result               Troponin, High Sensitivi...[[id number]]  Normal              Final result                 Please view results for these tests on the individual orders.        CT angio chest for pulmonary embolism   Final Result   1. No acute pulmonary arterial embolism.   2. Multifocal atherosclerotic disease as detailed above.   3. Focal area of parenchymal opacity within the inferior anterior   aspect of the right middle lobe which may represent infectious   pneumonitis or volume loss. Follow-up imaging following treatment   advised to document resolution.        MACRO:   None        Signed by: Marques Howell 6/3/2025 8:11 PM   Dictation workstation:   [id number]      XR chest 1 view   Final Result   No active disease in the chest identified.        MACRO:   None        Signed by: Arnie Adler 6/3/2025 3:59 PM   Dictation workstation:   [id number]           Procedures     Medical Decision Making  Patient was seen and evaluated due to complaints of bilateral anterior chest pain symptoms with shortness of breath.  The patient was not noted to be hypoxic when initially evaluated and EKGs revealed no evidence of ischemia.  Patient had a troponin and delta troponin performed and these were normal at 6 and 6.  CBC was unremarkable except for neutrophil of 6.24.  BNP was slightly elevated 138.  CMP revealed glucose  elevated 138 and sodium was low at 135.  GFR was 55 and ALT was 60.  Magnesium was normal at 2.11.  D-dimer was slightly elevated 729  age corrected was negative.  Chest x-ray revealed no active disease process.  Getting the delta troponin back I went back to talk to the patient and she continues to complain of chest pain.  I talked to the patient about her iodine allergy and she states that it was in 1977 and she has had IV contrast recently without any problem.  She was ordered a CTA of the chest and she was referred to the oncoming ED attending at 7 PM with disposition of the patient pending the CTA results.    Amount and/or Complexity of Data Reviewed  ECG/medicine tests: independent interpretation performed.     Details: EKG was interpreted by myself at 1528 reveals normal sinus rhythm with Q waves in the septal precordial leads no other acute ST or T wave changes noted.  P waves are biphasic.  The DE interval is 140 ms.  The QRS durations are 92 ms.  The QTc is 432 ms Axis is 29 degrees.  Repeat EKG was interpreted by myself at 1632 reveals normal sinus rhythm with no acute ST or T wave changes.  Heart rate is 77 bpm.  DE interval is 142 ms.  QRS duration 92 ms.  QTc is 450 ms Axis is 49 degrees.         ED Course as of 06/04/25 0811 Tue Jun 03, 2025 2037 EKG 1 NSR 2 Normal axis 3 No Ectopy [MS]      ED Course User Index  [MS] Martin Castro,          Diagnoses as of 06/04/25 0811   Costochondritis                    Bethel Hernández,   06/04/25 0811

## 2025-06-03 NOTE — PROGRESS NOTES
"General Surgery Consultation    Patient: Nimisha Ingram  : 1935  MRN: [id number]  Date of Consultation: 25    Referring Primary Care Provider: Mendy Dale MD    Chief Complaint: Dysphagia, left flank pain    History of Present Illness: Nimisha Ingram is a 89 y.o. old female seen at the request of Dr. Dale for evaluation of dysphagia as well as left flank pain.  She has a known recurrent lumbar hernia.  She sees a bulge at this site and her pain is focal at the site of this bulge.  This was previously repaired by Dr. Monson on 2020.  That was a primary repair.  She also has a known Bochdalek hernia on the right side, although she states her pain is all on the left side.  She occasionally wears a back brace to help with the discomfort.  Her main concern today though is dysphagia.  Over the past year she has been having trouble swallowing solid foods.  She cuts and chews her meat very well, but still feels as though it gets stuck in her esophagus.  She has to drink water in order to get it down.  Once it goes down she then has pain.  This occurs a couple times per week.  She denies ever vomiting because of this.  She denies any unintentional weight loss.  She has never had an upper endoscopy.  She has heartburn a couple times per week.  She takes Tums for this as needed, but is not on any regular medication for reflux.  This heartburn has been going on for over a year.  She denies any hematemesis.  She denies any dark black bowel movements.  Her brother had esophageal cancer diagnosed in his 70s.  She previously chewed tobacco \"as a kid\" and quit when she got .     Medical History:  Dysphagia  Bochdalek hernia  Renal cysts  Hypertension  Seizures  Thyroid disease  Arthritis  History of small bowel obstruction, 2020, resolved non-operatively    Surgical History:  Colonoscopy, 2025 by Dr. Monson, diverticulosis, 2 subcentimeter tubular adenomas removed  Left inguinal hernia " "repair x 3, 1 of which required small bowel resection  Lumbar hernia repair  Hysterectomy  Tubal ligation  Oophorectomy  Back surgery  Knee surgery    Home Medications:  Prior to Admission medications    Medication Sig Start Date End Date Taking? Authorizing Provider   aspirin 81 mg chewable tablet Chew 1 tablet (81 mg) once daily. 1/28/18   Historical Provider, MD   cholecalciferol (Vitamin D-3) 1,250 mcg (50,000 unit) capsule Take 1 capsule (1.25 mg) by mouth 1 (one) time per week.    Historical Provider, MD   cyanocobalamin (Vitamin B-12) 1,000 mcg tablet Take 1 tablet (1,000 mcg) by mouth once daily. 7/13/21   Historical Provider, MD   lamoTRIgine (LaMICtal) 25 mg tablet Take 3 tablets (75 mg) by mouth 2 times a day. 3/17/25   Mendy Dale MD   levothyroxine (Synthroid, Levoxyl) 88 mcg tablet Take 1 tablet (88 mcg) by mouth once daily. 3/17/25   Mendy Dale MD   multivitamin tablet Take 1 tablet by mouth once daily. 1/13/20   Historical Provider, MD   triamterene-hydrochlorothiazid (Maxzide-25) 37.5-25 mg tablet Take 0.5 tablets by mouth once daily. Take with food. 3/17/25 3/17/26  Mendy Dale MD     Allergies:  Cefazolin, fish containing products, iodinated contrast media    Family History:   Mother with colon cancer.  Brother with esophageal cancer, diagnosed in his 70's.    Social History:  Non-smoker.  No alcohol or drug use. She previously chewed tobacco \"as a kid\" and quit when she got .  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    ROS:  Constitutional:  no fever, sweats, and chills  Cardiovascular: No chest pain  Respiratory: No cough or shortness of breath  Gastrointestinal: + Dysphagia with solids.  No dysphagia with liquids.  + Odynophagia.  No hematemesis.  No melena.  Genitourinary: no dysuria  Musculoskeletal: + Left flank pain at site of bulge /recurrent lumbar hernia  Integumentary: no rashes  Neurological: no confusion  Endocrine: no heat or cold " "intolerance  Heme/Lymph: no easy bruising or bleeding    Objective:  /70   Pulse 72   Ht 1.549 m (5' 1\")   Wt 63.1 kg (139 lb 3.2 oz)   BMI 26.30 kg/m²     Physical Exam:  Constitutional: No acute distress, conversant, pleasant, accompanied by her son  Neurologic: alert and oriented  Psych: appropriate affect  Ears, Nose, Mouth and Throat: mucus membranes moist  Pulmonary: No labored breathing  Cardiovascular: Regular rate and rhythm  Abdomen: soft, non-distended, BMI 26  Musculoskeletal: She has a transverse scar on the left side of her back consistent with history of previous lumbar hernia repair.  She has a recurrent bulge at this site.  This reduces.  It is minimally tender.  Skin: no jaundice    Labs:  Labs from 11/6/24 reviewed: Hgb 14.3, Plts 575, Cr 1.02    Imaging:  CT a/p from 10/30/24 reviewed: Bilateral foramen of Bochdalek hernias.  The right side contains fat, right renal cyst, and a portion of the right kidney.  The left is smaller and contains fat.  Cholelithiasis.  Stable right adrenal adenoma.  Granulomatous disease with scarring and fibrosis in the right middle lobe.    Assessment and Plan: Nimisha Ingram is a 89 y.o. old female with dysphagia.  I have recommended EGD with possible biopsy and possible esophageal dilation.  We discussed that my largest concern is making sure that she does not have an esophageal cancer, particularly given her history of tobacco use, her brother having esophageal cancer, and her history of untreated symptoms of reflux.  We discussed possibility of finding an esophageal stricture in which case we would plan to perform a dilation.  We discussed risks of bleeding, perforation, and potentially not finding an etiology of her symptoms on endoscopy.  She was agreeable to proceed.  She is scheduled for EGD with possible esophageal dilation on 6/17/25.  In regards to her recurrent lumbar hernia, she is not interested in surgery at this time.  Her main concern is " her swallowing.  We discussed that she can continue to wear her back brace if it gives her symptomatic relief.    Bonnie Bailey MD  6/5/2025

## 2025-06-03 NOTE — H&P (VIEW-ONLY)
"General Surgery Consultation    Patient: Nimisha Ingram  : 1935  MRN: [id number]  Date of Consultation: 25    Referring Primary Care Provider: Mendy Dale MD    Chief Complaint: Dysphagia, left flank pain    History of Present Illness: Nimisha Ingram is a 89 y.o. old female seen at the request of Dr. Dale for evaluation of dysphagia as well as left flank pain.  She has a known recurrent lumbar hernia.  She sees a bulge at this site and her pain is focal at the site of this bulge.  This was previously repaired by Dr. Monson on 2020.  That was a primary repair.  She also has a known Bochdalek hernia on the right side, although she states her pain is all on the left side.  She occasionally wears a back brace to help with the discomfort.  Her main concern today though is dysphagia.  Over the past year she has been having trouble swallowing solid foods.  She cuts and chews her meat very well, but still feels as though it gets stuck in her esophagus.  She has to drink water in order to get it down.  Once it goes down she then has pain.  This occurs a couple times per week.  She denies ever vomiting because of this.  She denies any unintentional weight loss.  She has never had an upper endoscopy.  She has heartburn a couple times per week.  She takes Tums for this as needed, but is not on any regular medication for reflux.  This heartburn has been going on for over a year.  She denies any hematemesis.  She denies any dark black bowel movements.  Her brother had esophageal cancer diagnosed in his 70s.  She previously chewed tobacco \"as a kid\" and quit when she got .     Medical History:  Dysphagia  Bochdalek hernia  Renal cysts  Hypertension  Seizures  Thyroid disease  Arthritis  History of small bowel obstruction, 2020, resolved non-operatively    Surgical History:  Colonoscopy, 2025 by Dr. Monson, diverticulosis, 2 subcentimeter tubular adenomas removed  Left inguinal hernia " "repair x 3, 1 of which required small bowel resection  Lumbar hernia repair  Hysterectomy  Tubal ligation  Oophorectomy  Back surgery  Knee surgery    Home Medications:  Prior to Admission medications    Medication Sig Start Date End Date Taking? Authorizing Provider   aspirin 81 mg chewable tablet Chew 1 tablet (81 mg) once daily. 1/28/18   Historical Provider, MD   cholecalciferol (Vitamin D-3) 1,250 mcg (50,000 unit) capsule Take 1 capsule (1.25 mg) by mouth 1 (one) time per week.    Historical Provider, MD   cyanocobalamin (Vitamin B-12) 1,000 mcg tablet Take 1 tablet (1,000 mcg) by mouth once daily. 7/13/21   Historical Provider, MD   lamoTRIgine (LaMICtal) 25 mg tablet Take 3 tablets (75 mg) by mouth 2 times a day. 3/17/25   Mendy Dale MD   levothyroxine (Synthroid, Levoxyl) 88 mcg tablet Take 1 tablet (88 mcg) by mouth once daily. 3/17/25   Mendy Dale MD   multivitamin tablet Take 1 tablet by mouth once daily. 1/13/20   Historical Provider, MD   triamterene-hydrochlorothiazid (Maxzide-25) 37.5-25 mg tablet Take 0.5 tablets by mouth once daily. Take with food. 3/17/25 3/17/26  Mendy Dale MD     Allergies:  Cefazolin, fish containing products, iodinated contrast media    Family History:   Mother with colon cancer.  Brother with esophageal cancer, diagnosed in his 70's.    Social History:  Non-smoker.  No alcohol or drug use. She previously chewed tobacco \"as a kid\" and quit when she got .  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    ROS:  Constitutional:  no fever, sweats, and chills  Cardiovascular: No chest pain  Respiratory: No cough or shortness of breath  Gastrointestinal: + Dysphagia with solids.  No dysphagia with liquids.  + Odynophagia.  No hematemesis.  No melena.  Genitourinary: no dysuria  Musculoskeletal: + Left flank pain at site of bulge /recurrent lumbar hernia  Integumentary: no rashes  Neurological: no confusion  Endocrine: no heat or cold " "intolerance  Heme/Lymph: no easy bruising or bleeding    Objective:  /70   Pulse 72   Ht 1.549 m (5' 1\")   Wt 63.1 kg (139 lb 3.2 oz)   BMI 26.30 kg/m²     Physical Exam:  Constitutional: No acute distress, conversant, pleasant, accompanied by her son  Neurologic: alert and oriented  Psych: appropriate affect  Ears, Nose, Mouth and Throat: mucus membranes moist  Pulmonary: No labored breathing  Cardiovascular: Regular rate and rhythm  Abdomen: soft, non-distended, BMI 26  Musculoskeletal: She has a transverse scar on the left side of her back consistent with history of previous lumbar hernia repair.  She has a recurrent bulge at this site.  This reduces.  It is minimally tender.  Skin: no jaundice    Labs:  Labs from 11/6/24 reviewed: Hgb 14.3, Plts 575, Cr 1.02    Imaging:  CT a/p from 10/30/24 reviewed: Bilateral foramen of Bochdalek hernias.  The right side contains fat, right renal cyst, and a portion of the right kidney.  The left is smaller and contains fat.  Cholelithiasis.  Stable right adrenal adenoma.  Granulomatous disease with scarring and fibrosis in the right middle lobe.    Assessment and Plan: Nimisha Ingram is a 89 y.o. old female with dysphagia.  I have recommended EGD with possible biopsy and possible esophageal dilation.  We discussed that my largest concern is making sure that she does not have an esophageal cancer, particularly given her history of tobacco use, her brother having esophageal cancer, and her history of untreated symptoms of reflux.  We discussed possibility of finding an esophageal stricture in which case we would plan to perform a dilation.  We discussed risks of bleeding, perforation, and potentially not finding an etiology of her symptoms on endoscopy.  She was agreeable to proceed.  She is scheduled for EGD with possible esophageal dilation on 6/17/25.  In regards to her recurrent lumbar hernia, she is not interested in surgery at this time.  Her main concern is " her swallowing.  We discussed that she can continue to wear her back brace if it gives her symptomatic relief.    Bonnie Bailey MD  6/5/2025

## 2025-06-04 LAB
ATRIAL RATE: 77 BPM
ATRIAL RATE: 88 BPM
P AXIS: 83 DEGREES
P AXIS: 84 DEGREES
P OFFSET: 209 MS
P OFFSET: 212 MS
P ONSET: 151 MS
P ONSET: 152 MS
PR INTERVAL: 140 MS
PR INTERVAL: 142 MS
Q ONSET: 222 MS
Q ONSET: 222 MS
QRS COUNT: 12 BEATS
QRS COUNT: 14 BEATS
QRS DURATION: 92 MS
QRS DURATION: 92 MS
QT INTERVAL: 358 MS
QT INTERVAL: 398 MS
QTC CALCULATION(BAZETT): 433 MS
QTC CALCULATION(BAZETT): 450 MS
QTC FREDERICIA: 406 MS
QTC FREDERICIA: 432 MS
R AXIS: 29 DEGREES
R AXIS: 49 DEGREES
T AXIS: 79 DEGREES
T AXIS: 83 DEGREES
T OFFSET: 401 MS
T OFFSET: 421 MS
VENTRICULAR RATE: 77 BPM
VENTRICULAR RATE: 88 BPM

## 2025-06-04 ASSESSMENT — HEART SCORE
TROPONIN: LESS THAN OR EQUAL TO NORMAL LIMIT
AGE: 65+
ECG: NORMAL
HEART SCORE: 3
HISTORY: SLIGHTLY SUSPICIOUS
RISK FACTORS: 1-2 RISK FACTORS

## 2025-06-04 NOTE — ED PROVIDER NOTES
Emergency Medicine Transition of Care Note.    I received Nimisha Ingram in signout from Dr. Hernández.  Please see the previous ED provider note for all HPI, PE and MDM up to the time of signout at 1900. This is in addition to the primary record.  I did speak to the family and go over the results of the test.  Patient states that the chest pain is with movement and deep inspiration.  Patient will be given Toradol 15 mg IV and reassess.  The patient's pain resolved with the IV Toradol.  Patient will be discharged home.  Patient is improved and stable.    In brief Nimisha Ingram is an 89 y.o. female presenting for   Chief Complaint   Patient presents with    Shortness of Breath     Since yesterday. Pt states that she thought it would get better but it hasn't. Also complains of CP 5/10 that started yesterday and has increased over night    Chest Pain     At the time of signout we were awaiting: CT scan    ED Course as of 06/03/25 2057 Tue Jun 03, 2025 2037 EKG 1 NSR 2 Normal axis 3 No Ectopy [MS]      ED Course User Index  [MS] Martin Castro DO         Diagnoses as of 06/03/25 2057   Costochondritis     Labs Reviewed   CBC WITH AUTO DIFFERENTIAL - Abnormal       Result Value    WBC 9.6      nRBC 0.0      RBC 4.33      Hemoglobin 12.5      Hematocrit 38.4      MCV 89      MCH 28.9      MCHC 32.6      RDW 13.1      Platelets 367      Neutrophils % 65.2      Immature Granulocytes %, Automated 0.2      Lymphocytes % 14.5      Monocytes % 17.7      Eosinophils % 2.2      Basophils % 0.2      Neutrophils Absolute 6.24 (*)     Immature Granulocytes Absolute, Automated 0.02      Lymphocytes Absolute 1.39      Monocytes Absolute 1.69 (*)     Eosinophils Absolute 0.21      Basophils Absolute 0.02     COMPREHENSIVE METABOLIC PANEL - Abnormal    Glucose 138 (*)     Sodium 135 (*)     Potassium 3.7      Chloride 100      Bicarbonate 27      Anion Gap 12      Urea Nitrogen 18      Creatinine 0.98      eGFR 55 (*)     Calcium  9.3      Albumin 4.1      Alkaline Phosphatase 63      Total Protein 7.1      AST 11      Bilirubin, Total 0.8      ALT 6 (*)    D-DIMER, VTE EXCLUSION - Abnormal    D-Dimer, Quantitative VTE Exclusion 724 (*)     Narrative:     The VTE Exclusion D-Dimer assay is reported in ng/mL Fibrinogen Equivalent Units (FEU).    Per 's instructions for use, a value of less than 500 ng/mL (FEU) may help to exclude DVT or PE in outpatients when the assay is used with a clinical pretest probability assessment.([facility] must utilize and document eCalc 'Wells Score Deep Vein Thrombosis Risk' for DVT exclusion only. Emergency Department should utilize  Guidelines for Emergency Department Use of the VTE Exclusion D-Dimer and Clinical Pretest probability assessment model for DVT or PE exclusion.)   B-TYPE NATRIURETIC PEPTIDE - Abnormal     (*)     Narrative:        <100 pg/mL - Heart failure unlikely  100-299 pg/mL - Intermediate probability of acute heart                  failure exacerbation. Correlate with clinical                  context and patient history.    >=300 pg/mL - Heart Failure likely. Correlate with clinical                  context and patient history.    BNP testing is performed using different testing methodology at [facility] than at other [facility]. Direct result comparisons should only be made within the same method.      MAGNESIUM - Normal    Magnesium 2.11     SERIAL TROPONIN-INITIAL - Normal    Troponin I, High Sensitivity 6      Narrative:     Less than 99th percentile of normal range cutoff-  Female and children under 18 years old <14 ng/L; Male <21 ng/L: Negative  Repeat testing should be performed if clinically indicated.     Female and children under 18 years old 14-50 ng/L; Male 21-50 ng/L:  Consistent with possible cardiac damage and possible increased clinical   risk. Serial measurements may help to assess extent of myocardial damage.     >50 ng/L: Consistent with  cardiac damage, increased clinical risk and  myocardial infarction. Serial measurements may help assess extent of   myocardial damage.      NOTE: Children less than 1 year old may have higher baseline troponin   levels and results should be interpreted in conjunction with the overall   clinical context.     NOTE: Troponin I testing is performed using a different   testing methodology at [facility] than at other   [facility]. Direct result comparisons should only   be made within the same method.   SERIAL TROPONIN, 1 HOUR - Normal    Troponin I, High Sensitivity 6      Narrative:     Less than 99th percentile of normal range cutoff-  Female and children under 18 years old <14 ng/L; Male <21 ng/L: Negative  Repeat testing should be performed if clinically indicated.     Female and children under 18 years old 14-50 ng/L; Male 21-50 ng/L:  Consistent with possible cardiac damage and possible increased clinical   risk. Serial measurements may help to assess extent of myocardial damage.     >50 ng/L: Consistent with cardiac damage, increased clinical risk and  myocardial infarction. Serial measurements may help assess extent of   myocardial damage.      NOTE: Children less than 1 year old may have higher baseline troponin   levels and results should be interpreted in conjunction with the overall   clinical context.     NOTE: Troponin I testing is performed using a different   testing methodology at [facility] than at other   [facility]. Direct result comparisons should only   be made within the same method.   TROPONIN SERIES- (INITIAL, 1 HR)    Narrative:     The following orders were created for panel order Troponin I Series, High Sensitivity (0, 1 HR).  Procedure                               Abnormality         Status                     ---------                               -----------         ------                     Troponin I, High Sensiti...[[id number]]  Normal               Final result               Troponin, High Sensitivi...[[id number]]  Normal              Final result                 Please view results for these tests on the individual orders.      CT angio chest for pulmonary embolism   Final Result   1. No acute pulmonary arterial embolism.   2. Multifocal atherosclerotic disease as detailed above.   3. Focal area of parenchymal opacity within the inferior anterior   aspect of the right middle lobe which may represent infectious   pneumonitis or volume loss. Follow-up imaging following treatment   advised to document resolution.        MACRO:   None        Signed by: Marques Howell 6/3/2025 8:11 PM   Dictation workstation:   [id number]      XR chest 1 view   Final Result   No active disease in the chest identified.        MACRO:   None        Signed by: Arnie Adler 6/3/2025 3:59 PM   Dictation workstation:   [id number]         Medical Decision Making  1 take medication as prescribed 2 follow-up with family medical doctor in 1 to 2 days if no improvement return to ED.        Final diagnoses:   [M94.0] Costochondritis           Procedure  Procedures    DO Martin Laboy DO  06/03/25 2057

## 2025-06-05 ENCOUNTER — OFFICE VISIT (OUTPATIENT)
Dept: SURGERY | Facility: CLINIC | Age: 89
End: 2025-06-05
Payer: COMMERCIAL

## 2025-06-05 ENCOUNTER — DOCUMENTATION (OUTPATIENT)
Dept: SURGERY | Facility: CLINIC | Age: 89
End: 2025-06-05

## 2025-06-05 VITALS
WEIGHT: 139.2 LBS | HEART RATE: 72 BPM | DIASTOLIC BLOOD PRESSURE: 70 MMHG | SYSTOLIC BLOOD PRESSURE: 136 MMHG | HEIGHT: 61 IN | BODY MASS INDEX: 26.28 KG/M2

## 2025-06-05 DIAGNOSIS — K45.8 HERNIA OF FLANK: Primary | ICD-10-CM

## 2025-06-05 DIAGNOSIS — R13.19 ESOPHAGEAL DYSPHAGIA: ICD-10-CM

## 2025-06-05 NOTE — LETTER
2025     Mendy Dale MD  [address] [address]    Patient: Nimisha Ingram   YOB: 1935   Date of Visit: 2025       Dear Dr. Mendy Dale MD:    Thank you for referring Nimisha Ingram to me for evaluation. Below are my notes for this consultation.  If you have questions, please do not hesitate to call me. I look forward to following your patient along with you.       Sincerely,     Bonnie Bailey MD      CC: No Recipients  ______________________________________________________________________________________    General Surgery Consultation    Patient: Nimisha Ingram  : 1935  MRN: [id number]  Date of Consultation: 25    Referring Primary Care Provider: Mendy Dale MD    Chief Complaint: Dysphagia, left flank pain    History of Present Illness: Nimisha Ingram is a 89 y.o. old female seen at the request of Dr. Dale for evaluation of dysphagia as well as left flank pain.  She has a known recurrent lumbar hernia.  She sees a bulge at this site and her pain is focal at the site of this bulge.  This was previously repaired by Dr. Monson on 2020.  That was a primary repair.  She also has a known Bochdalek hernia on the right side, although she states her pain is all on the left side.  She occasionally wears a back brace to help with the discomfort.  Her main concern today though is dysphagia.  Over the past year she has been having trouble swallowing solid foods.  She cuts and chews her meat very well, but still feels as though it gets stuck in her esophagus.  She has to drink water in order to get it down.  Once it goes down she then has pain.  This occurs a couple times per week.  She denies ever vomiting because of this.  She denies any unintentional weight loss.  She has never had an upper endoscopy.  She has heartburn a couple times per week.  She takes Tums for this as needed, but is not on any regular medication for reflux.  This heartburn  "has been going on for over a year.  She denies any hematemesis.  She denies any dark black bowel movements.  Her brother had esophageal cancer diagnosed in his 70s.  She previously chewed tobacco \"as a kid\" and quit when she got .     Medical History:  Dysphagia  Bochdalek hernia  Renal cysts  Hypertension  Seizures  Thyroid disease  Arthritis  History of small bowel obstruction, 8/8/2020, resolved non-operatively    Surgical History:  Colonoscopy, 4/17/2025 by Dr. Monson, diverticulosis, 2 subcentimeter tubular adenomas removed  Left inguinal hernia repair x 3, 1 of which required small bowel resection  Lumbar hernia repair  Hysterectomy  Tubal ligation  Oophorectomy  Back surgery  Knee surgery    Home Medications:  Prior to Admission medications    Medication Sig Start Date End Date Taking? Authorizing Provider   aspirin 81 mg chewable tablet Chew 1 tablet (81 mg) once daily. 1/28/18   Historical Provider, MD   cholecalciferol (Vitamin D-3) 1,250 mcg (50,000 unit) capsule Take 1 capsule (1.25 mg) by mouth 1 (one) time per week.    Historical Provider, MD   cyanocobalamin (Vitamin B-12) 1,000 mcg tablet Take 1 tablet (1,000 mcg) by mouth once daily. 7/13/21   Historical Provider, MD   lamoTRIgine (LaMICtal) 25 mg tablet Take 3 tablets (75 mg) by mouth 2 times a day. 3/17/25   Mendy Dale MD   levothyroxine (Synthroid, Levoxyl) 88 mcg tablet Take 1 tablet (88 mcg) by mouth once daily. 3/17/25   Mendy Dale MD   multivitamin tablet Take 1 tablet by mouth once daily. 1/13/20   Historical Provider, MD   triamterene-hydrochlorothiazid (Maxzide-25) 37.5-25 mg tablet Take 0.5 tablets by mouth once daily. Take with food. 3/17/25 3/17/26  Mendy Dale MD     Allergies:  Cefazolin, fish containing products, iodinated contrast media    Family History:   Mother with colon cancer.  Brother with esophageal cancer, diagnosed in his 70's.    Social History:  Non-smoker.  No alcohol or drug use. She previously " "chewed tobacco \"as a kid\" and quit when she got .  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    ROS:  Constitutional:  no fever, sweats, and chills  Cardiovascular: No chest pain  Respiratory: No cough or shortness of breath  Gastrointestinal: + Dysphagia with solids.  No dysphagia with liquids.  + Odynophagia.  No hematemesis.  No melena.  Genitourinary: no dysuria  Musculoskeletal: + Left flank pain at site of bulge /recurrent lumbar hernia  Integumentary: no rashes  Neurological: no confusion  Endocrine: no heat or cold intolerance  Heme/Lymph: no easy bruising or bleeding    Objective:  /70   Pulse 72   Ht 1.549 m (5' 1\")   Wt 63.1 kg (139 lb 3.2 oz)   BMI 26.30 kg/m²     Physical Exam:  Constitutional: No acute distress, conversant, pleasant, accompanied by her son  Neurologic: alert and oriented  Psych: appropriate affect  Ears, Nose, Mouth and Throat: mucus membranes moist  Pulmonary: No labored breathing  Cardiovascular: Regular rate and rhythm  Abdomen: soft, non-distended, BMI 26  Musculoskeletal: She has a transverse scar on the left side of her back consistent with history of previous lumbar hernia repair.  She has a recurrent bulge at this site.  This reduces.  It is minimally tender.  Skin: no jaundice    Labs:  Labs from 11/6/24 reviewed: Hgb 14.3, Plts 575, Cr 1.02    Imaging:  CT a/p from 10/30/24 reviewed: Bilateral foramen of Bochdalek hernias.  The right side contains fat, right renal cyst, and a portion of the right kidney.  The left is smaller and contains fat.  Cholelithiasis.  Stable right adrenal adenoma.  Granulomatous disease with scarring and fibrosis in the right middle lobe.    Assessment and Plan: Nimisha Ingram is a 89 y.o. old female with dysphagia.  I have recommended EGD with possible biopsy and possible esophageal dilation.  We discussed that my largest concern is making sure that she does not have an esophageal cancer, particularly " given her history of tobacco use, her brother having esophageal cancer, and her history of untreated symptoms of reflux.  We discussed possibility of finding an esophageal stricture in which case we would plan to perform a dilation.  We discussed risks of bleeding, perforation, and potentially not finding an etiology of her symptoms on endoscopy.  She was agreeable to proceed.  She is scheduled for EGD with possible esophageal dilation on 6/17/25.  In regards to her recurrent lumbar hernia, she is not interested in surgery at this time.  Her main concern is her swallowing.  We discussed that she can continue to wear her back brace if it gives her symptomatic relief.    Bonnie Bailey MD  6/5/2025

## 2025-06-05 NOTE — PROGRESS NOTES
Notified patient of EGD  scheduled on  6-17-25 .Instructions reviewed. Advised patient [facility] will contact them 24 hours before surgery with arrival time. Pt voices understanding. Aziza Arredondo MA.

## 2025-06-17 ENCOUNTER — ANESTHESIA (OUTPATIENT)
Dept: OPERATING ROOM | Facility: HOSPITAL | Age: 89
End: 2025-06-17
Payer: COMMERCIAL

## 2025-06-17 ENCOUNTER — APPOINTMENT (OUTPATIENT)
Age: 89
End: 2025-06-17
Payer: COMMERCIAL

## 2025-06-17 ENCOUNTER — ANESTHESIA EVENT (OUTPATIENT)
Dept: OPERATING ROOM | Facility: HOSPITAL | Age: 89
End: 2025-06-17
Payer: COMMERCIAL

## 2025-06-17 ENCOUNTER — HOSPITAL ENCOUNTER (OUTPATIENT)
Dept: OPERATING ROOM | Facility: HOSPITAL | Age: 89
Setting detail: OUTPATIENT SURGERY
Discharge: HOME | End: 2025-06-17
Payer: COMMERCIAL

## 2025-06-17 VITALS
HEIGHT: 61 IN | TEMPERATURE: 97.9 F | OXYGEN SATURATION: 96 % | RESPIRATION RATE: 16 BRPM | SYSTOLIC BLOOD PRESSURE: 161 MMHG | DIASTOLIC BLOOD PRESSURE: 63 MMHG | HEART RATE: 71 BPM | WEIGHT: 135.58 LBS | BODY MASS INDEX: 25.6 KG/M2

## 2025-06-17 DIAGNOSIS — R13.19 ESOPHAGEAL DYSPHAGIA: ICD-10-CM

## 2025-06-17 DIAGNOSIS — K45.8 HERNIA OF FLANK: Primary | ICD-10-CM

## 2025-06-17 PROCEDURE — 3600000002 HC OR TIME - INITIAL BASE CHARGE - PROCEDURE LEVEL TWO: Performed by: ANESTHESIOLOGY

## 2025-06-17 PROCEDURE — 3700000001 HC GENERAL ANESTHESIA TIME - INITIAL BASE CHARGE: Performed by: ANESTHESIOLOGY

## 2025-06-17 PROCEDURE — 2500000004 HC RX 250 GENERAL PHARMACY W/ HCPCS (ALT 636 FOR OP/ED): Performed by: ANESTHESIOLOGY

## 2025-06-17 PROCEDURE — 3700000002 HC GENERAL ANESTHESIA TIME - EACH INCREMENTAL 1 MINUTE: Performed by: ANESTHESIOLOGY

## 2025-06-17 PROCEDURE — 3600000007 HC OR TIME - EACH INCREMENTAL 1 MINUTE - PROCEDURE LEVEL TWO: Performed by: ANESTHESIOLOGY

## 2025-06-17 PROCEDURE — 7100000010 HC PHASE TWO TIME - EACH INCREMENTAL 1 MINUTE: Performed by: ANESTHESIOLOGY

## 2025-06-17 PROCEDURE — 7100000009 HC PHASE TWO TIME - INITIAL BASE CHARGE: Performed by: ANESTHESIOLOGY

## 2025-06-17 PROCEDURE — 2500000005 HC RX 250 GENERAL PHARMACY W/O HCPCS: Performed by: ANESTHESIOLOGY

## 2025-06-17 PROCEDURE — 43239 EGD BIOPSY SINGLE/MULTIPLE: CPT | Performed by: SURGERY

## 2025-06-17 RX ORDER — MIDAZOLAM HYDROCHLORIDE 1 MG/ML
2 INJECTION INTRAMUSCULAR; INTRAVENOUS ONCE AS NEEDED
Status: DISCONTINUED | OUTPATIENT
Start: 2025-06-17 | End: 2025-06-17 | Stop reason: HOSPADM

## 2025-06-17 RX ORDER — SODIUM CHLORIDE, SODIUM LACTATE, POTASSIUM CHLORIDE, CALCIUM CHLORIDE 600; 310; 30; 20 MG/100ML; MG/100ML; MG/100ML; MG/100ML
100 INJECTION, SOLUTION INTRAVENOUS CONTINUOUS
Status: DISCONTINUED | OUTPATIENT
Start: 2025-06-17 | End: 2025-06-18 | Stop reason: HOSPADM

## 2025-06-17 RX ORDER — LIDOCAINE HYDROCHLORIDE 20 MG/ML
SOLUTION OROPHARYNGEAL AS NEEDED
Status: COMPLETED | OUTPATIENT
Start: 2025-06-17 | End: 2025-06-17

## 2025-06-17 RX ORDER — LIDOCAINE HYDROCHLORIDE 10 MG/ML
INJECTION, SOLUTION EPIDURAL; INFILTRATION; INTRACAUDAL; PERINEURAL AS NEEDED
Status: DISCONTINUED | OUTPATIENT
Start: 2025-06-17 | End: 2025-06-17

## 2025-06-17 RX ORDER — PROPOFOL 10 MG/ML
INJECTION, EMULSION INTRAVENOUS AS NEEDED
Status: DISCONTINUED | OUTPATIENT
Start: 2025-06-17 | End: 2025-06-17

## 2025-06-17 RX ADMIN — SODIUM CHLORIDE, SODIUM LACTATE, POTASSIUM CHLORIDE, AND CALCIUM CHLORIDE 100 ML/HR: .6; .31; .03; .02 INJECTION, SOLUTION INTRAVENOUS at 07:27

## 2025-06-17 RX ADMIN — PROPOFOL 30 MG: 10 INJECTION, EMULSION INTRAVENOUS at 08:48

## 2025-06-17 RX ADMIN — LIDOCAINE HYDROCHLORIDE 15 ML: 20 SOLUTION ORAL; TOPICAL at 08:34

## 2025-06-17 RX ADMIN — LIDOCAINE HYDROCHLORIDE 3 ML: 10 INJECTION, SOLUTION EPIDURAL; INFILTRATION; INTRACAUDAL; PERINEURAL at 08:33

## 2025-06-17 RX ADMIN — PROPOFOL 30 MG: 10 INJECTION, EMULSION INTRAVENOUS at 08:42

## 2025-06-17 RX ADMIN — PROPOFOL 30 MG: 10 INJECTION, EMULSION INTRAVENOUS at 08:38

## 2025-06-17 RX ADMIN — PROPOFOL 30 MG: 10 INJECTION, EMULSION INTRAVENOUS at 08:33

## 2025-06-17 SDOH — HEALTH STABILITY: MENTAL HEALTH: CURRENT SMOKER: 0

## 2025-06-17 ASSESSMENT — PAIN SCALES - GENERAL
PAINLEVEL_OUTOF10: 0 - NO PAIN
PAIN_LEVEL: 3
PAINLEVEL_OUTOF10: 0 - NO PAIN
PAINLEVEL_OUTOF10: 0 - NO PAIN

## 2025-06-17 ASSESSMENT — PAIN - FUNCTIONAL ASSESSMENT
PAIN_FUNCTIONAL_ASSESSMENT: 0-10

## 2025-06-17 ASSESSMENT — COLUMBIA-SUICIDE SEVERITY RATING SCALE - C-SSRS
6. HAVE YOU EVER DONE ANYTHING, STARTED TO DO ANYTHING, OR PREPARED TO DO ANYTHING TO END YOUR LIFE?: NO
2. HAVE YOU ACTUALLY HAD ANY THOUGHTS OF KILLING YOURSELF?: NO
1. IN THE PAST MONTH, HAVE YOU WISHED YOU WERE DEAD OR WISHED YOU COULD GO TO SLEEP AND NOT WAKE UP?: NO

## 2025-06-17 NOTE — ANESTHESIA POSTPROCEDURE EVALUATION
Patient: Nimisha Ingram    Procedure Summary       Date: 06/17/25 Room / Location: [facility] OR    Anesthesia Start: 0832 Anesthesia Stop: 0852    Procedure: EGD Diagnosis:       Esophageal dysphagia      Hernia of flank    Scheduled Providers: Bonnie Bailey MD; Brandon Edge MD; Janell Shetty RN; Linette Ivory RN Responsible Provider: Brandon Edge MD    Anesthesia Type: MAC ASA Status: 2            Anesthesia Type: MAC    Vitals Value Taken Time   BP  06/17/25 09:00   Temp 97.2 06/17/25 09:00   Pulse 80 06/17/25 09:00   Resp 12 06/17/25 09:00   SpO2 99 06/17/25 09:00       Anesthesia Post Evaluation    Patient location during evaluation: PACU  Patient participation: complete - patient participated  Level of consciousness: awake and alert  Pain score: 3  Pain management: adequate  Multimodal analgesia pain management approach  Airway patency: patent  Cardiovascular status: acceptable  Respiratory status: acceptable  Hydration status: acceptable  Postoperative Nausea and Vomiting: none        No notable events documented.

## 2025-06-17 NOTE — ANESTHESIA PREPROCEDURE EVALUATION
Patient: Nimisha Ingram    Procedure Information       Date/Time: 06/17/25 0830    Scheduled providers: Bonnie Bailey MD; Brandon Edge MD; Janell Shetty, RN; Linette Ivory RN    Procedure: EGD    Location: [facility] OR            Relevant Problems   Anesthesia (within normal limits)      Cardiac   (+) Chest pain, unspecified type   (+) Essential hypertension   (+) Familial hyperlipidemia      Pulmonary (within normal limits)      Neuro   (+) Seizure disorder (Multi)      GI   (+) Esophageal dysphagia      /Renal (within normal limits)      Liver (within normal limits)      Endocrine   (+) Acquired hypothyroidism      Hematology (within normal limits)      Musculoskeletal   (+) Osteoarthritis of right shoulder      HEENT (within normal limits)      ID (within normal limits)      Skin (within normal limits)      GYN (within normal limits)       Clinical information reviewed:   Tobacco  Allergies  Meds   Med Hx  Surg Hx  OB Status  Fam Hx  Soc   Hx        NPO Detail:  NPO/Void Status  Carbohydrate Drink Given Prior to Surgery? : N  Date of Last Liquid: 06/16/25  Time of Last Liquid: 2100  Date of Last Solid: 06/16/25  Time of Last Solid: 1800  Last Intake Type: Clear fluids         Physical Exam    Airway  Mallampati: II  TM distance: >3 FB  Neck ROM: full     Cardiovascular   Rhythm: regular  Rate: normal     Dental     (+) upper dentures, lower dentures     Pulmonary Breath sounds clear to auscultation     Abdominal            Anesthesia Plan    History of general anesthesia?: yes  History of complications of general anesthesia?: no    ASA 2     MAC     The patient is not a current smoker.    intravenous induction   Postoperative pain plan includes opioids.  Anesthetic plan and risks discussed with patient.

## 2025-06-18 ENCOUNTER — TELEPHONE (OUTPATIENT)
Dept: SURGERY | Facility: CLINIC | Age: 89
End: 2025-06-18
Payer: COMMERCIAL

## 2025-06-18 NOTE — TELEPHONE ENCOUNTER
Spoke to patient after EGD. Pt denies fever, chills, nausea, and vomiting. Pt had no questions at this time.  Provided office number to patient for any questions.  will call in 2 weeks with pathology results.

## 2025-06-19 NOTE — ADDENDUM NOTE
Addendum  created 06/19/25 0754 by Brandon Edge MD    Intraprocedure Event edited, Intraprocedure Staff edited

## 2025-06-30 ENCOUNTER — TELEPHONE (OUTPATIENT)
Dept: SURGERY | Facility: CLINIC | Age: 89
End: 2025-06-30
Payer: COMMERCIAL

## 2025-06-30 NOTE — TELEPHONE ENCOUNTER
I spoke with the patient over the phone to discuss pathology from recent EGD.  She had esophageal spasm on EGD, which may be contributing to her dysphagia.  We discussed the possibility of starting a calcium channel blocker.  She is going to discuss this with Dr. Dale when she sees her next, particularly if the swallowing were to worsen in the future.  In the meantime, we discussed taking small bites/chewing food well, eating slowly, and drinking plenty of water with meals.  I took a biopsy to rule out H. pylori given that she had what appeared to be a small healed scar from an old ulcer.  The biopsy that I took showed reactive gastropathy with intestinal metaplasia, but was negative for H. pylori.  No further treatment from a surgical standpoint at this time, but I would be happy to see her back as needed.    Bonnie Bailey MD  06/30/25  11:09 AM

## 2025-09-04 ENCOUNTER — APPOINTMENT (OUTPATIENT)
Age: 89
End: 2025-09-04
Payer: COMMERCIAL

## 2025-09-04 ASSESSMENT — PATIENT HEALTH QUESTIONNAIRE - PHQ9
SUM OF ALL RESPONSES TO PHQ9 QUESTIONS 1 AND 2: 0
1. LITTLE INTEREST OR PLEASURE IN DOING THINGS: NOT AT ALL
2. FEELING DOWN, DEPRESSED OR HOPELESS: NOT AT ALL

## 2025-12-23 ENCOUNTER — APPOINTMENT (OUTPATIENT)
Age: 89
End: 2025-12-23
Payer: COMMERCIAL

## 2026-07-02 ENCOUNTER — APPOINTMENT (OUTPATIENT)
Age: OVER 89
End: 2026-07-02
Payer: COMMERCIAL